# Patient Record
Sex: MALE | Race: WHITE | NOT HISPANIC OR LATINO
[De-identification: names, ages, dates, MRNs, and addresses within clinical notes are randomized per-mention and may not be internally consistent; named-entity substitution may affect disease eponyms.]

---

## 2017-02-02 ENCOUNTER — LABORATORY RESULT (OUTPATIENT)
Age: 47
End: 2017-02-02

## 2017-02-21 ENCOUNTER — TRANSCRIPTION ENCOUNTER (OUTPATIENT)
Age: 47
End: 2017-02-21

## 2017-03-28 ENCOUNTER — APPOINTMENT (OUTPATIENT)
Dept: UROLOGY | Facility: CLINIC | Age: 47
End: 2017-03-28

## 2017-03-29 ENCOUNTER — FORM ENCOUNTER (OUTPATIENT)
Age: 47
End: 2017-03-29

## 2017-03-30 ENCOUNTER — OUTPATIENT (OUTPATIENT)
Dept: OUTPATIENT SERVICES | Facility: HOSPITAL | Age: 47
LOS: 1 days | End: 2017-03-30
Payer: COMMERCIAL

## 2017-03-30 ENCOUNTER — APPOINTMENT (OUTPATIENT)
Dept: ULTRASOUND IMAGING | Facility: CLINIC | Age: 47
End: 2017-03-30

## 2017-03-30 DIAGNOSIS — Z00.8 ENCOUNTER FOR OTHER GENERAL EXAMINATION: ICD-10-CM

## 2017-03-30 DIAGNOSIS — M75.101 UNSPECIFIED ROTATOR CUFF TEAR OR RUPTURE OF RIGHT SHOULDER, NOT SPECIFIED AS TRAUMATIC: Chronic | ICD-10-CM

## 2017-03-30 PROCEDURE — 76775 US EXAM ABDO BACK WALL LIM: CPT

## 2017-04-04 ENCOUNTER — APPOINTMENT (OUTPATIENT)
Dept: UROLOGY | Facility: CLINIC | Age: 47
End: 2017-04-04

## 2017-04-04 VITALS
HEIGHT: 71 IN | HEART RATE: 69 BPM | TEMPERATURE: 98.6 F | WEIGHT: 270 LBS | DIASTOLIC BLOOD PRESSURE: 76 MMHG | SYSTOLIC BLOOD PRESSURE: 120 MMHG | RESPIRATION RATE: 16 BRPM | BODY MASS INDEX: 37.8 KG/M2

## 2017-04-05 LAB — PSA SERPL-MCNC: 0.66 NG/ML

## 2017-04-10 LAB
TESTOST BND SERPL-MCNC: 6.6 PG/ML
TESTOST SERPL-MCNC: 311.6 NG/DL

## 2017-04-12 ENCOUNTER — APPOINTMENT (OUTPATIENT)
Dept: UROLOGY | Facility: CLINIC | Age: 47
End: 2017-04-12

## 2017-04-19 ENCOUNTER — APPOINTMENT (OUTPATIENT)
Dept: UROLOGY | Facility: CLINIC | Age: 47
End: 2017-04-19

## 2017-04-19 RX ORDER — ZOLPIDEM TARTRATE 5 MG/1
TABLET, FILM COATED ORAL
Refills: 0 | Status: ACTIVE | COMMUNITY

## 2017-04-20 LAB
ESTRADIOL SERPL-MCNC: 37 PG/ML
PROLACTIN SERPL-MCNC: 6.8 NG/ML
TSH SERPL-ACNC: 1.67 UIU/ML

## 2017-04-22 LAB — T4BG SERPL-MCNC: 19 UG/ML

## 2017-04-24 LAB
TESTOST BND SERPL-MCNC: 2.8 PG/ML
TESTOST SERPL-MCNC: 239.6 NG/DL

## 2017-04-26 ENCOUNTER — APPOINTMENT (OUTPATIENT)
Dept: UROLOGY | Facility: CLINIC | Age: 47
End: 2017-04-26

## 2017-04-26 VITALS
DIASTOLIC BLOOD PRESSURE: 80 MMHG | HEART RATE: 73 BPM | RESPIRATION RATE: 16 BRPM | SYSTOLIC BLOOD PRESSURE: 119 MMHG | TEMPERATURE: 98.3 F

## 2017-04-27 LAB
FSH SERPL-MCNC: 2.2 IU/L
LH SERPL-ACNC: 3.8 IU/L

## 2017-04-28 ENCOUNTER — MESSAGE (OUTPATIENT)
Age: 47
End: 2017-04-28

## 2017-06-15 ENCOUNTER — APPOINTMENT (OUTPATIENT)
Dept: UROLOGY | Facility: CLINIC | Age: 47
End: 2017-06-15

## 2017-06-15 LAB
ALBUMIN SERPL ELPH-MCNC: 4.3 G/DL
ALP BLD-CCNC: 94 U/L
ALT SERPL-CCNC: 30 U/L
ANION GAP SERPL CALC-SCNC: 18 MMOL/L
AST SERPL-CCNC: 36 U/L
BASOPHILS # BLD AUTO: 0.02 K/UL
BASOPHILS NFR BLD AUTO: 0.3 %
BILIRUB SERPL-MCNC: 0.2 MG/DL
BUN SERPL-MCNC: 18 MG/DL
CALCIUM SERPL-MCNC: 9.5 MG/DL
CHLORIDE SERPL-SCNC: 102 MMOL/L
CO2 SERPL-SCNC: 22 MMOL/L
CREAT SERPL-MCNC: 0.82 MG/DL
EOSINOPHIL # BLD AUTO: 0.09 K/UL
EOSINOPHIL NFR BLD AUTO: 1.4 %
ESTRADIOL SERPL-MCNC: 44 PG/ML
GLUCOSE SERPL-MCNC: 82 MG/DL
HCT VFR BLD CALC: 42.7 %
HGB BLD-MCNC: 14.4 G/DL
IMM GRANULOCYTES NFR BLD AUTO: 0.3 %
LYMPHOCYTES # BLD AUTO: 2.07 K/UL
LYMPHOCYTES NFR BLD AUTO: 32.1 %
MAN DIFF?: NORMAL
MCHC RBC-ENTMCNC: 30.7 PG
MCHC RBC-ENTMCNC: 33.7 GM/DL
MCV RBC AUTO: 91 FL
MONOCYTES # BLD AUTO: 0.43 K/UL
MONOCYTES NFR BLD AUTO: 6.7 %
NEUTROPHILS # BLD AUTO: 3.82 K/UL
NEUTROPHILS NFR BLD AUTO: 59.2 %
PLATELET # BLD AUTO: 273 K/UL
POTASSIUM SERPL-SCNC: 4.2 MMOL/L
PROT SERPL-MCNC: 7.5 G/DL
RBC # BLD: 4.69 M/UL
RBC # FLD: 13.8 %
SODIUM SERPL-SCNC: 142 MMOL/L
TESTOST BND SERPL-MCNC: 9.4 PG/ML
TESTOST SERPL-MCNC: 383.4 NG/DL
WBC # FLD AUTO: 6.45 K/UL

## 2017-06-19 ENCOUNTER — MOBILE ON CALL (OUTPATIENT)
Age: 47
End: 2017-06-19

## 2017-06-23 ENCOUNTER — MEDICATION RENEWAL (OUTPATIENT)
Age: 47
End: 2017-06-23

## 2017-06-23 ENCOUNTER — OTHER (OUTPATIENT)
Age: 47
End: 2017-06-23

## 2017-06-28 ENCOUNTER — MEDICATION RENEWAL (OUTPATIENT)
Age: 47
End: 2017-06-28

## 2017-07-14 ENCOUNTER — OTHER (OUTPATIENT)
Age: 47
End: 2017-07-14

## 2017-07-19 ENCOUNTER — APPOINTMENT (OUTPATIENT)
Dept: UROLOGY | Facility: CLINIC | Age: 47
End: 2017-07-19

## 2017-07-19 VITALS
DIASTOLIC BLOOD PRESSURE: 83 MMHG | OXYGEN SATURATION: 97 % | HEART RATE: 88 BPM | SYSTOLIC BLOOD PRESSURE: 121 MMHG | TEMPERATURE: 98.7 F

## 2017-07-20 LAB
BASOPHILS # BLD AUTO: 0.01 K/UL
BASOPHILS NFR BLD AUTO: 0.1 %
EOSINOPHIL # BLD AUTO: 0.09 K/UL
EOSINOPHIL NFR BLD AUTO: 1.2 %
HCT VFR BLD CALC: 45.2 %
HGB BLD-MCNC: 14.7 G/DL
IMM GRANULOCYTES NFR BLD AUTO: 0.1 %
LYMPHOCYTES # BLD AUTO: 1.99 K/UL
LYMPHOCYTES NFR BLD AUTO: 26.7 %
MAN DIFF?: NORMAL
MCHC RBC-ENTMCNC: 30.6 PG
MCHC RBC-ENTMCNC: 32.5 GM/DL
MCV RBC AUTO: 94 FL
MONOCYTES # BLD AUTO: 0.49 K/UL
MONOCYTES NFR BLD AUTO: 6.6 %
NEUTROPHILS # BLD AUTO: 4.86 K/UL
NEUTROPHILS NFR BLD AUTO: 65.3 %
PLATELET # BLD AUTO: 298 K/UL
RBC # BLD: 4.81 M/UL
RBC # FLD: 14.4 %
TESTOST SERPL-MCNC: 684.2 NG/DL
WBC # FLD AUTO: 7.45 K/UL

## 2017-08-29 ENCOUNTER — MEDICATION RENEWAL (OUTPATIENT)
Age: 47
End: 2017-08-29

## 2017-10-17 ENCOUNTER — APPOINTMENT (OUTPATIENT)
Dept: UROLOGY | Facility: CLINIC | Age: 47
End: 2017-10-17

## 2017-10-19 ENCOUNTER — APPOINTMENT (OUTPATIENT)
Dept: UROLOGY | Facility: CLINIC | Age: 47
End: 2017-10-19
Payer: COMMERCIAL

## 2017-10-19 PROCEDURE — 99213 OFFICE O/P EST LOW 20 MIN: CPT

## 2017-12-13 ENCOUNTER — APPOINTMENT (OUTPATIENT)
Dept: UROLOGY | Facility: CLINIC | Age: 47
End: 2017-12-13
Payer: COMMERCIAL

## 2017-12-13 VITALS
OXYGEN SATURATION: 97 % | TEMPERATURE: 98.5 F | HEART RATE: 81 BPM | SYSTOLIC BLOOD PRESSURE: 119 MMHG | DIASTOLIC BLOOD PRESSURE: 74 MMHG

## 2017-12-13 PROCEDURE — 11980 IMPLANT HORMONE PELLET(S): CPT

## 2017-12-13 PROCEDURE — S0189: CPT

## 2018-03-14 ENCOUNTER — APPOINTMENT (OUTPATIENT)
Dept: UROLOGY | Facility: CLINIC | Age: 48
End: 2018-03-14

## 2018-07-16 ENCOUNTER — APPOINTMENT (OUTPATIENT)
Dept: UROLOGY | Facility: CLINIC | Age: 48
End: 2018-07-16

## 2018-07-25 ENCOUNTER — APPOINTMENT (OUTPATIENT)
Dept: UROLOGY | Facility: CLINIC | Age: 48
End: 2018-07-25
Payer: COMMERCIAL

## 2018-07-25 PROCEDURE — 99213 OFFICE O/P EST LOW 20 MIN: CPT

## 2018-07-25 RX ORDER — TESTOSTERONE 50 MG/5G
50 MG/5GM GEL TRANSDERMAL
Qty: 5 | Refills: 0 | Status: DISCONTINUED | COMMUNITY
Start: 2017-04-26 | End: 2018-07-25

## 2018-07-26 LAB — ESTRADIOL SERPL-MCNC: 45 PG/ML

## 2018-07-27 LAB — TESTOST SERPL-MCNC: 234.6 NG/DL

## 2018-09-13 ENCOUNTER — MEDICATION RENEWAL (OUTPATIENT)
Age: 48
End: 2018-09-13

## 2018-09-26 ENCOUNTER — MEDICATION RENEWAL (OUTPATIENT)
Age: 48
End: 2018-09-26

## 2018-10-03 ENCOUNTER — RX RENEWAL (OUTPATIENT)
Age: 48
End: 2018-10-03

## 2018-10-03 ENCOUNTER — MEDICATION RENEWAL (OUTPATIENT)
Age: 48
End: 2018-10-03

## 2018-10-03 RX ORDER — TESTOSTERONE UNDECANOATE 250 MG/ML
750 INJECTION INTRAMUSCULAR
Qty: 2 | Refills: 0 | Status: COMPLETED | COMMUNITY
Start: 2018-07-25 | End: 2018-10-03

## 2018-10-04 ENCOUNTER — MEDICATION RENEWAL (OUTPATIENT)
Age: 48
End: 2018-10-04

## 2018-11-07 ENCOUNTER — APPOINTMENT (OUTPATIENT)
Dept: UROLOGY | Facility: CLINIC | Age: 48
End: 2018-11-07
Payer: COMMERCIAL

## 2018-11-07 VITALS
OXYGEN SATURATION: 98 % | TEMPERATURE: 98.6 F | HEART RATE: 88 BPM | SYSTOLIC BLOOD PRESSURE: 126 MMHG | DIASTOLIC BLOOD PRESSURE: 83 MMHG

## 2018-11-07 VITALS
OXYGEN SATURATION: 98 % | SYSTOLIC BLOOD PRESSURE: 131 MMHG | DIASTOLIC BLOOD PRESSURE: 84 MMHG | HEART RATE: 87 BPM | TEMPERATURE: 98.1 F

## 2018-11-07 PROCEDURE — 96372 THER/PROPH/DIAG INJ SC/IM: CPT

## 2018-11-07 RX ORDER — TESTOSTERONE UNDECANOATE 250 MG/ML
750 INJECTION INTRAMUSCULAR
Refills: 0 | Status: COMPLETED | OUTPATIENT
Start: 2018-11-07

## 2018-11-07 RX ADMIN — TESTOSTERONE UNDECANOATE 3 MG/3ML: 250 INJECTION INTRAMUSCULAR at 00:00

## 2018-12-05 ENCOUNTER — APPOINTMENT (OUTPATIENT)
Dept: UROLOGY | Facility: CLINIC | Age: 48
End: 2018-12-05
Payer: COMMERCIAL

## 2018-12-05 VITALS — DIASTOLIC BLOOD PRESSURE: 83 MMHG | SYSTOLIC BLOOD PRESSURE: 123 MMHG | HEART RATE: 84 BPM | OXYGEN SATURATION: 98 %

## 2018-12-05 VITALS
DIASTOLIC BLOOD PRESSURE: 77 MMHG | HEART RATE: 83 BPM | TEMPERATURE: 98.6 F | SYSTOLIC BLOOD PRESSURE: 114 MMHG | OXYGEN SATURATION: 97 %

## 2018-12-05 PROCEDURE — 96372 THER/PROPH/DIAG INJ SC/IM: CPT

## 2018-12-05 RX ORDER — TESTOSTERONE UNDECANOATE 250 MG/ML
750 INJECTION INTRAMUSCULAR
Refills: 0 | Status: COMPLETED | OUTPATIENT
Start: 2018-12-05

## 2018-12-05 RX ADMIN — TESTOSTERONE UNDECANOATE 0 MG/3ML: 250 INJECTION INTRAMUSCULAR at 00:00

## 2019-01-16 ENCOUNTER — MEDICATION RENEWAL (OUTPATIENT)
Age: 49
End: 2019-01-16

## 2019-01-18 ENCOUNTER — MESSAGE (OUTPATIENT)
Age: 49
End: 2019-01-18

## 2019-01-23 LAB
ALBUMIN SERPL ELPH-MCNC: 4.3 G/DL
ALP BLD-CCNC: 87 U/L
ALT SERPL-CCNC: 20 U/L
ANION GAP SERPL CALC-SCNC: 11 MMOL/L
AST SERPL-CCNC: 36 U/L
BASOPHILS # BLD AUTO: 0.02 K/UL
BASOPHILS NFR BLD AUTO: 0.3 %
BILIRUB SERPL-MCNC: 0.2 MG/DL
BUN SERPL-MCNC: 14 MG/DL
CALCIUM SERPL-MCNC: 9.9 MG/DL
CHLORIDE SERPL-SCNC: 102 MMOL/L
CO2 SERPL-SCNC: 27 MMOL/L
CREAT SERPL-MCNC: 0.89 MG/DL
EOSINOPHIL # BLD AUTO: 0.09 K/UL
EOSINOPHIL NFR BLD AUTO: 1.2 %
ESTRADIOL SERPL-MCNC: 42 PG/ML
GLUCOSE SERPL-MCNC: 125 MG/DL
HCT VFR BLD CALC: 45.5 %
HGB BLD-MCNC: 15.1 G/DL
IMM GRANULOCYTES NFR BLD AUTO: 0.3 %
LYMPHOCYTES # BLD AUTO: 2.2 K/UL
LYMPHOCYTES NFR BLD AUTO: 28.4 %
MAN DIFF?: NORMAL
MCHC RBC-ENTMCNC: 30 PG
MCHC RBC-ENTMCNC: 33.2 GM/DL
MCV RBC AUTO: 90.3 FL
MONOCYTES # BLD AUTO: 0.54 K/UL
MONOCYTES NFR BLD AUTO: 7 %
NEUTROPHILS # BLD AUTO: 4.89 K/UL
NEUTROPHILS NFR BLD AUTO: 62.8 %
PLATELET # BLD AUTO: 322 K/UL
POTASSIUM SERPL-SCNC: 4.5 MMOL/L
PROT SERPL-MCNC: 7.8 G/DL
RBC # BLD: 5.04 M/UL
RBC # FLD: 13.5 %
SODIUM SERPL-SCNC: 140 MMOL/L
TESTOST SERPL-MCNC: 263.8 NG/DL
WBC # FLD AUTO: 7.76 K/UL

## 2019-02-13 ENCOUNTER — APPOINTMENT (OUTPATIENT)
Dept: UROLOGY | Facility: CLINIC | Age: 49
End: 2019-02-13
Payer: COMMERCIAL

## 2019-02-13 VITALS
SYSTOLIC BLOOD PRESSURE: 123 MMHG | TEMPERATURE: 98.4 F | DIASTOLIC BLOOD PRESSURE: 81 MMHG | HEART RATE: 84 BPM | OXYGEN SATURATION: 95 %

## 2019-02-13 VITALS
HEART RATE: 77 BPM | TEMPERATURE: 98.6 F | SYSTOLIC BLOOD PRESSURE: 125 MMHG | DIASTOLIC BLOOD PRESSURE: 87 MMHG | OXYGEN SATURATION: 94 %

## 2019-02-13 PROCEDURE — 96372 THER/PROPH/DIAG INJ SC/IM: CPT

## 2019-02-13 PROCEDURE — 99213 OFFICE O/P EST LOW 20 MIN: CPT | Mod: 25

## 2019-02-13 RX ORDER — TESTOSTERONE UNDECANOATE 250 MG/ML
750 INJECTION INTRAMUSCULAR
Refills: 0 | Status: COMPLETED | OUTPATIENT
Start: 2019-02-13

## 2019-02-13 RX ORDER — TESTOSTERONE UNDECANOATE 250 MG/ML
750 INJECTION INTRAMUSCULAR
Qty: 1 | Refills: 0 | Status: COMPLETED | COMMUNITY
Start: 2018-07-25 | End: 2019-02-13

## 2019-02-13 RX ADMIN — TESTOSTERONE UNDECANOATE 0 MG/3ML: 250 INJECTION INTRAMUSCULAR at 00:00

## 2019-02-13 NOTE — PHYSICAL EXAM
[General Appearance - Well Developed] : well developed [Bowel Sounds] : normal bowel sounds [Abdomen Soft] : soft [Abdomen Tenderness] : non-tender [] : no hepato-splenomegaly [Urethral Meatus] : meatus normal [Penis Abnormality] : normal circumcised penis [Epididymis] : the epididymides were normal [Testes Tenderness] : no tenderness of the testes [Skin Color & Pigmentation] : normal skin color and pigmentation

## 2019-02-13 NOTE — ASSESSMENT
[FreeTextEntry1] : Patient is doing well with AVEED\par "much better than other "\par good sexual function with sildenafil (60-80mg)\par libido maintained on AVEED\par Feels back to NORMAL\par pleased

## 2019-02-13 NOTE — HISTORY OF PRESENT ILLNESS
[None] : no symptoms [FreeTextEntry1] : Patient referred by Dr Ankur Abel for sexual dysfunction.\par Patient complaining of loss of libido x 3 months.\par No evaluation.\par Erections generally good.\par One one occasion lost erection during SI.\par  2.5 (second marriage) years\par 2 step children; and one 1.5 year old son.\par Son with neutropenia (viral)\par Couple very anxious\par \par 4.26.2017\par patient took only 40 mg of sildenafil ; 4/10 rigidity\par One hour later had partial tumescence\par Sufficient for SI; experienced premature ejaculation; had SI\par mild nasal congestion otherwise no side effects\par \par 5. 2017 started on testim; no side effects; "felt amazing" for the first several days and then regressed towards pretreatment. Symptoms better than prior to treatment.\par \par 7.19.2017\par Continues on testim one tube daily; had difficulty getting 2 tubes daily; insurance covered brand name but not generic. Energy improved, sexual function with Sildenafil 100 mg; erections good, no prolonged erection. Pleased\par \par 10.19.2017\par again stopped T replacement\par insurance would pay for brand but not generic.\par has been off for several days\par sexual function ok on sildenafil \par \par 7.25.2018\par returns with diminished libido\par seen by internist and repeated T\par T 183\par free T 39.1 \par \par \par 2.13.2019 \par patient is now maintained on AVEED (3rd injection today)

## 2019-03-19 ENCOUNTER — MESSAGE (OUTPATIENT)
Age: 49
End: 2019-03-19

## 2019-03-26 ENCOUNTER — MEDICATION RENEWAL (OUTPATIENT)
Age: 49
End: 2019-03-26

## 2019-04-17 ENCOUNTER — APPOINTMENT (OUTPATIENT)
Dept: UROLOGY | Facility: CLINIC | Age: 49
End: 2019-04-17

## 2019-04-24 ENCOUNTER — APPOINTMENT (OUTPATIENT)
Dept: UROLOGY | Facility: CLINIC | Age: 49
End: 2019-04-24
Payer: COMMERCIAL

## 2019-04-24 VITALS
HEART RATE: 97 BPM | OXYGEN SATURATION: 94 % | SYSTOLIC BLOOD PRESSURE: 132 MMHG | TEMPERATURE: 98.2 F | DIASTOLIC BLOOD PRESSURE: 84 MMHG

## 2019-04-24 VITALS — SYSTOLIC BLOOD PRESSURE: 118 MMHG | DIASTOLIC BLOOD PRESSURE: 81 MMHG

## 2019-04-24 PROCEDURE — 96372 THER/PROPH/DIAG INJ SC/IM: CPT

## 2019-04-25 RX ORDER — TESTOSTERONE UNDECANOATE 250 MG/ML
750 INJECTION INTRAMUSCULAR
Refills: 0 | Status: COMPLETED | OUTPATIENT
Start: 2019-04-25

## 2019-04-25 RX ADMIN — TESTOSTERONE UNDECANOATE 0 MG/3ML: 250 INJECTION INTRAMUSCULAR at 00:00

## 2019-06-17 ENCOUNTER — OTHER (OUTPATIENT)
Age: 49
End: 2019-06-17

## 2019-06-19 ENCOUNTER — MEDICATION RENEWAL (OUTPATIENT)
Age: 49
End: 2019-06-19

## 2019-06-24 LAB
ALBUMIN SERPL ELPH-MCNC: 4.2 G/DL
ALP BLD-CCNC: 91 U/L
ALT SERPL-CCNC: 20 U/L
ANION GAP SERPL CALC-SCNC: 13 MMOL/L
AST SERPL-CCNC: 26 U/L
BASOPHILS # BLD AUTO: 0.03 K/UL
BASOPHILS NFR BLD AUTO: 0.4 %
BILIRUB SERPL-MCNC: 0.3 MG/DL
BUN SERPL-MCNC: 13 MG/DL
CALCIUM SERPL-MCNC: 9.1 MG/DL
CHLORIDE SERPL-SCNC: 101 MMOL/L
CO2 SERPL-SCNC: 24 MMOL/L
CREAT SERPL-MCNC: 0.83 MG/DL
EOSINOPHIL # BLD AUTO: 0.06 K/UL
EOSINOPHIL NFR BLD AUTO: 0.8 %
ESTRADIOL SERPL-MCNC: 73 PG/ML
GLUCOSE SERPL-MCNC: 124 MG/DL
HCT VFR BLD CALC: 45 %
HGB BLD-MCNC: 14.6 G/DL
IMM GRANULOCYTES NFR BLD AUTO: 0.4 %
LYMPHOCYTES # BLD AUTO: 1.95 K/UL
LYMPHOCYTES NFR BLD AUTO: 25.5 %
MAN DIFF?: NORMAL
MCHC RBC-ENTMCNC: 29 PG
MCHC RBC-ENTMCNC: 32.4 GM/DL
MCV RBC AUTO: 89.3 FL
MONOCYTES # BLD AUTO: 0.54 K/UL
MONOCYTES NFR BLD AUTO: 7.1 %
NEUTROPHILS # BLD AUTO: 5.04 K/UL
NEUTROPHILS NFR BLD AUTO: 65.8 %
PLATELET # BLD AUTO: 262 K/UL
POTASSIUM SERPL-SCNC: 4.5 MMOL/L
PROT SERPL-MCNC: 6.8 G/DL
RBC # BLD: 5.04 M/UL
RBC # FLD: 13.7 %
SODIUM SERPL-SCNC: 138 MMOL/L
TESTOST SERPL-MCNC: 463 NG/DL
TSH SERPL-ACNC: 2.86 UIU/ML
WBC # FLD AUTO: 7.65 K/UL

## 2019-06-26 ENCOUNTER — APPOINTMENT (OUTPATIENT)
Dept: UROLOGY | Facility: CLINIC | Age: 49
End: 2019-06-26
Payer: COMMERCIAL

## 2019-06-26 VITALS
SYSTOLIC BLOOD PRESSURE: 127 MMHG | TEMPERATURE: 98.2 F | OXYGEN SATURATION: 96 % | DIASTOLIC BLOOD PRESSURE: 84 MMHG | HEART RATE: 88 BPM

## 2019-06-26 VITALS — SYSTOLIC BLOOD PRESSURE: 135 MMHG | DIASTOLIC BLOOD PRESSURE: 85 MMHG | OXYGEN SATURATION: 96 % | HEART RATE: 82 BPM

## 2019-06-26 PROCEDURE — 99213 OFFICE O/P EST LOW 20 MIN: CPT | Mod: 25

## 2019-06-26 PROCEDURE — 96372 THER/PROPH/DIAG INJ SC/IM: CPT

## 2019-06-26 RX ORDER — TESTOSTERONE 50 MG/5G
50 MG/5GM GEL TRANSDERMAL
Qty: 5 | Refills: 0 | Status: DISCONTINUED | COMMUNITY
Start: 2017-06-15 | End: 2019-06-26

## 2019-06-26 RX ORDER — TESTOSTERONE 50 MG/5G
50 MG/5GM GEL TRANSDERMAL
Qty: 30 | Refills: 0 | Status: DISCONTINUED | COMMUNITY
Start: 2017-06-28 | End: 2019-06-26

## 2019-06-26 RX ORDER — TESTOSTERONE 50 MG/5G
50 MG/5GM GEL TRANSDERMAL
Qty: 30 | Refills: 0 | Status: DISCONTINUED | COMMUNITY
Start: 2017-07-20 | End: 2019-06-26

## 2019-06-26 RX ORDER — TESTOSTERONE UNDECANOATE 250 MG/ML
750 INJECTION INTRAMUSCULAR
Refills: 0 | Status: COMPLETED | OUTPATIENT
Start: 2019-06-26

## 2019-06-26 RX ORDER — TESTOSTERONE 50 MG/5G
50 MG/5GM GEL TRANSDERMAL
Qty: 30 | Refills: 0 | Status: DISCONTINUED | COMMUNITY
Start: 2017-05-08 | End: 2019-06-26

## 2019-06-26 RX ORDER — TESTOSTERONE 50 MG/5G
50 MG/5GM GEL TRANSDERMAL
Qty: 30 | Refills: 0 | Status: DISCONTINUED | COMMUNITY
Start: 2017-04-27 | End: 2019-06-26

## 2019-06-26 RX ORDER — TESTOSTERONE 50 MG/5G
50 MG/5GM GEL TRANSDERMAL
Qty: 1 | Refills: 0 | Status: DISCONTINUED | COMMUNITY
Start: 2017-10-19 | End: 2019-06-26

## 2019-06-26 RX ADMIN — TESTOSTERONE UNDECANOATE 0 MG/3ML: 250 INJECTION INTRAMUSCULAR at 00:00

## 2019-06-26 NOTE — ASSESSMENT
[FreeTextEntry1] : Patient on maintenance aveed\par pleased in general although feels that not doing as well at end of interval\par emphasized importance of staying on schedule\par discussed increased E2 and potential utilization of arimidex\par will check levels midcycle to assess T levels and determine

## 2019-06-26 NOTE — HISTORY OF PRESENT ILLNESS
[FreeTextEntry1] : Patient referred by Dr Ankur Abel for sexual dysfunction.\par Patient complaining of loss of libido x 3 months.\par No evaluation.\par Erections generally good.\par One one occasion lost erection during SI.\par  2.5 (second marriage) years\par 2 step children; and one 1.5 year old son.\par Son with neutropenia (viral)\par Couple very anxious\par \par 4.26.2017\par patient took only 40 mg of sildenafil ; 4/10 rigidity\par One hour later had partial tumescence\par Sufficient for SI; experienced premature ejaculation; had SI\par mild nasal congestion otherwise no side effects\par \par 5. 2017 started on testim; no side effects; "felt amazing" for the first several days and then regressed towards pretreatment. Symptoms better than prior to treatment.\par \par 7.19.2017\par Continues on testim one tube daily; had difficulty getting 2 tubes daily; insurance covered brand name but not generic. Energy improved, sexual function with Sildenafil 100 mg; erections good, no prolonged erection. Pleased\par \par 10.19.2017\par again stopped T replacement\par insurance would pay for brand but not generic.\par has been off for several days\par sexual function ok on sildenafil \par \par 7.25.2018\par returns with diminished libido\par seen by internist and repeated T\par T 183\par free T 39.1 \par \par \par 2.13.2019 \par patient is now maintained on AVEED (3rd injection today)\par \par 6.26.2019\par patient maintained on AVEED\par missed injection cycle\par now complaining of loss of libido and energy\par had surgery for deviated septum

## 2019-07-18 ENCOUNTER — LABORATORY RESULT (OUTPATIENT)
Age: 49
End: 2019-07-18

## 2019-08-07 ENCOUNTER — APPOINTMENT (OUTPATIENT)
Dept: UROLOGY | Facility: CLINIC | Age: 49
End: 2019-08-07

## 2019-08-14 ENCOUNTER — APPOINTMENT (OUTPATIENT)
Dept: UROLOGY | Facility: CLINIC | Age: 49
End: 2019-08-14
Payer: COMMERCIAL

## 2019-08-14 LAB
ESTRADIOL SERPL-MCNC: 7 PG/ML
HCT VFR BLD CALC: 50.2 %
HGB BLD-MCNC: 15.8 G/DL
TESTOST SERPL-MCNC: 376 NG/DL

## 2019-08-14 PROCEDURE — 99213 OFFICE O/P EST LOW 20 MIN: CPT

## 2019-08-14 NOTE — PHYSICAL EXAM
[General Appearance - Well Developed] : well developed [General Appearance - Well Nourished] : well nourished [Bowel Sounds] : normal bowel sounds [Urethral Meatus] : meatus normal [Testes Tenderness] : no tenderness of the testes [FreeTextEntry1] : no acne

## 2019-08-14 NOTE — HISTORY OF PRESENT ILLNESS
[FreeTextEntry1] : Patient referred by Dr Ankur Abel for sexual dysfunction.\par Patient complaining of loss of libido x 3 months.\par No evaluation.\par Erections generally good.\par One one occasion lost erection during SI.\par  2.5 (second marriage) years\par 2 step children; and one 1.5 year old son.\par Son with neutropenia (viral)\par Couple very anxious\par \par 4.26.2017\par patient took only 40 mg of sildenafil ; 4/10 rigidity\par One hour later had partial tumescence\par Sufficient for SI; experienced premature ejaculation; had SI\par mild nasal congestion otherwise no side effects\par \par 5. 2017 started on testim; no side effects; "felt amazing" for the first several days and then regressed towards pretreatment. Symptoms better than prior to treatment.\par \par 7.19.2017\par Continues on testim one tube daily; had difficulty getting 2 tubes daily; insurance covered brand name but not generic. Energy improved, sexual function with Sildenafil 100 mg; erections good, no prolonged erection. Pleased\par \par 10.19.2017\par again stopped T replacement\par insurance would pay for brand but not generic.\par has been off for several days\par sexual function ok on sildenafil \par \par 7.25.2018\par returns with diminished libido\par seen by internist and repeated T\par T 183\par free T 39.1 \par \par \par 2.13.2019 \par patient is now maintained on AVEED (3rd injection today)\par \par 6.26.2019\par patient maintained on AVEED\par missed injection cycle\par now complaining of loss of libido and energy\par had surgery for deviated septum \par \par 8.14.2019\par patient on AVEED and Arimidex\par Improved energy and sexual functon

## 2019-08-14 NOTE — ASSESSMENT
[FreeTextEntry1] : Labs reviewed\par Estradiol now normal\par T and Hbg/Hct stable\par Continue on current regimen\par Aveed q 10 weeks\par \par discussed weight control\par discussed referral to weight control/obesity

## 2019-08-26 ENCOUNTER — MEDICATION RENEWAL (OUTPATIENT)
Age: 49
End: 2019-08-26

## 2019-08-28 ENCOUNTER — APPOINTMENT (OUTPATIENT)
Dept: UROLOGY | Facility: CLINIC | Age: 49
End: 2019-08-28
Payer: COMMERCIAL

## 2019-08-28 VITALS
SYSTOLIC BLOOD PRESSURE: 132 MMHG | DIASTOLIC BLOOD PRESSURE: 84 MMHG | OXYGEN SATURATION: 95 % | BODY MASS INDEX: 38.08 KG/M2 | HEIGHT: 71 IN | HEART RATE: 78 BPM | WEIGHT: 272 LBS

## 2019-08-28 VITALS
DIASTOLIC BLOOD PRESSURE: 87 MMHG | TEMPERATURE: 98 F | SYSTOLIC BLOOD PRESSURE: 128 MMHG | HEART RATE: 67 BPM | OXYGEN SATURATION: 96 %

## 2019-08-28 PROCEDURE — 96372 THER/PROPH/DIAG INJ SC/IM: CPT

## 2019-08-28 PROCEDURE — 99213 OFFICE O/P EST LOW 20 MIN: CPT | Mod: 25

## 2019-08-28 RX ORDER — TESTOSTERONE 50 MG/5G
50 MG/5GM GEL TRANSDERMAL
Qty: 30 | Refills: 0 | Status: DISCONTINUED | COMMUNITY
Start: 2017-06-28 | End: 2019-08-28

## 2019-08-29 RX ORDER — TESTOSTERONE UNDECANOATE 250 MG/ML
750 INJECTION INTRAMUSCULAR
Refills: 0 | Status: COMPLETED | OUTPATIENT
Start: 2019-08-29

## 2019-08-29 RX ADMIN — TESTOSTERONE UNDECANOATE 0 MG/3ML: 250 INJECTION INTRAMUSCULAR at 00:00

## 2019-10-16 ENCOUNTER — FORM ENCOUNTER (OUTPATIENT)
Age: 49
End: 2019-10-16

## 2019-10-17 ENCOUNTER — APPOINTMENT (OUTPATIENT)
Dept: ULTRASOUND IMAGING | Facility: IMAGING CENTER | Age: 49
End: 2019-10-17
Payer: COMMERCIAL

## 2019-10-17 ENCOUNTER — APPOINTMENT (OUTPATIENT)
Dept: UROLOGY | Facility: CLINIC | Age: 49
End: 2019-10-17
Payer: COMMERCIAL

## 2019-10-17 ENCOUNTER — OUTPATIENT (OUTPATIENT)
Dept: OUTPATIENT SERVICES | Facility: HOSPITAL | Age: 49
LOS: 1 days | End: 2019-10-17
Payer: COMMERCIAL

## 2019-10-17 DIAGNOSIS — R10.9 UNSPECIFIED ABDOMINAL PAIN: ICD-10-CM

## 2019-10-17 DIAGNOSIS — M75.101 UNSPECIFIED ROTATOR CUFF TEAR OR RUPTURE OF RIGHT SHOULDER, NOT SPECIFIED AS TRAUMATIC: Chronic | ICD-10-CM

## 2019-10-17 PROCEDURE — 99213 OFFICE O/P EST LOW 20 MIN: CPT

## 2019-10-17 PROCEDURE — 76770 US EXAM ABDO BACK WALL COMP: CPT | Mod: 26

## 2019-10-17 PROCEDURE — 76770 US EXAM ABDO BACK WALL COMP: CPT

## 2019-10-20 NOTE — HISTORY OF PRESENT ILLNESS
[FreeTextEntry1] : Silverio Lange returns to the office today. He is a 49-year-old male with history of kidney stones. He is back today in the office because he recently was experiencing some right-sided lower back pain and flank pain this started about 2 weeks ago. The patient states that this has not been nearly as severe as when he had known kidney stones in the past but he wanted to be sure that he was not experiencing any recurrence of kidney stones. The pain started 2 weeks ago but he does feel as though it is improving. There is no associated hematuria or dysuria. He also denies any fevers or chills and has had no nausea or vomiting. His appetite is stable. He denies any unintentional weight loss. There is no constipation.

## 2019-10-20 NOTE — ASSESSMENT
[FreeTextEntry1] : I sent the patient for renal sonogram to assess for any evidence of stone either by direct visualization or indirect evidence by hydronephrosis. Both kidneys showed no evidence of hydronephrosis or stone on either side. He has a lower pole cyst of the left kidney measuring 1.6 cm in largest dimension which is decreased in size relative to prior evaluation. At this time, the appears to be no evidence of stone.\par \par I reassured the patient that there is no evidence of stone disease at this time. I suspect his flank pain with likely musculoskeletal and because of his are continuing to improve, I don't think he needs to take any additional intervention at this time.

## 2019-11-07 ENCOUNTER — MEDICATION RENEWAL (OUTPATIENT)
Age: 49
End: 2019-11-07

## 2019-11-13 ENCOUNTER — APPOINTMENT (OUTPATIENT)
Dept: UROLOGY | Facility: CLINIC | Age: 49
End: 2019-11-13
Payer: COMMERCIAL

## 2019-11-13 VITALS
OXYGEN SATURATION: 100 % | HEART RATE: 88 BPM | TEMPERATURE: 98.4 F | SYSTOLIC BLOOD PRESSURE: 134 MMHG | DIASTOLIC BLOOD PRESSURE: 91 MMHG

## 2019-11-13 PROCEDURE — 96372 THER/PROPH/DIAG INJ SC/IM: CPT

## 2019-11-13 PROCEDURE — 99212 OFFICE O/P EST SF 10 MIN: CPT | Mod: 25

## 2019-11-14 RX ORDER — TESTOSTERONE UNDECANOATE 250 MG/ML
750 INJECTION INTRAMUSCULAR
Refills: 0 | Status: COMPLETED | OUTPATIENT
Start: 2019-11-14

## 2019-11-14 RX ADMIN — TESTOSTERONE UNDECANOATE 0 MG/3ML: 250 INJECTION INTRAMUSCULAR at 00:00

## 2019-11-14 NOTE — HISTORY OF PRESENT ILLNESS
[FreeTextEntry1] : Patient referred by Dr Ankur Abel for sexual dysfunction.\par Patient complaining of loss of libido x 3 months.\par No evaluation.\par Erections generally good.\par One one occasion lost erection during SI.\par  2.5 (second marriage) years\par 2 step children; and one 1.5 year old son.\par Son with neutropenia (viral)\par Couple very anxious\par \par 4.26.2017\par patient took only 40 mg of sildenafil ; 4/10 rigidity\par One hour later had partial tumescence\par Sufficient for SI; experienced premature ejaculation; had SI\par mild nasal congestion otherwise no side effects\par \par 5. 2017 started on testim; no side effects; "felt amazing" for the first several days and then regressed towards pretreatment. Symptoms better than prior to treatment.\par \par 7.19.2017\par Continues on testim one tube daily; had difficulty getting 2 tubes daily; insurance covered brand name but not generic. Energy improved, sexual function with Sildenafil 100 mg; erections good, no prolonged erection. Pleased\par \par 10.19.2017\par again stopped T replacement\par insurance would pay for brand but not generic.\par has been off for several days\par sexual function ok on sildenafil \par \par 7.25.2018\par returns with diminished libido\par seen by internist and repeated T\par T 183\par free T 39.1 \par \par \par 2.13.2019 \par patient is now maintained on AVEED (3rd injection today)\par \par 6.26.2019\par patient maintained on AVEED\par missed injection cycle\par now complaining of loss of libido and energy\par had surgery for deviated septum \par \par 8.14.2019\par patient on AVEED and Arimidex\par Improved energy and sexual functon\par \par 11.13.2019\par patient returns for AVEED and management\par he has stopped  ARIMIDEX (ran out of)\par feeling less energetic during this cycle

## 2019-11-14 NOTE — ASSESSMENT
[FreeTextEntry1] : Patient on maintenance aveed\par pleased in general although feels that not doing as well at end of interval\par DIscussed and emphasized importance of staying on schedule\par discussed increased E2 and  arimidex\par \par will restart Arimidex and assess labs in 2 week since patient feels not as good as previously

## 2019-11-20 ENCOUNTER — APPOINTMENT (OUTPATIENT)
Dept: UROLOGY | Facility: CLINIC | Age: 49
End: 2019-11-20

## 2019-11-25 ENCOUNTER — TRANSCRIPTION ENCOUNTER (OUTPATIENT)
Age: 49
End: 2019-11-25

## 2020-01-02 ENCOUNTER — MEDICATION RENEWAL (OUTPATIENT)
Age: 50
End: 2020-01-02

## 2020-01-30 ENCOUNTER — APPOINTMENT (OUTPATIENT)
Dept: UROLOGY | Facility: CLINIC | Age: 50
End: 2020-01-30
Payer: COMMERCIAL

## 2020-01-30 VITALS
TEMPERATURE: 98.3 F | OXYGEN SATURATION: 97 % | HEART RATE: 100 BPM | DIASTOLIC BLOOD PRESSURE: 91 MMHG | SYSTOLIC BLOOD PRESSURE: 136 MMHG

## 2020-01-30 VITALS — HEART RATE: 79 BPM | SYSTOLIC BLOOD PRESSURE: 132 MMHG | OXYGEN SATURATION: 96 % | DIASTOLIC BLOOD PRESSURE: 86 MMHG

## 2020-01-30 PROCEDURE — 96372 THER/PROPH/DIAG INJ SC/IM: CPT

## 2020-01-30 PROCEDURE — 99212 OFFICE O/P EST SF 10 MIN: CPT | Mod: 25

## 2020-01-30 RX ORDER — TESTOSTERONE UNDECANOATE 250 MG/ML
750 INJECTION INTRAMUSCULAR
Refills: 0 | Status: COMPLETED | OUTPATIENT
Start: 2020-01-30

## 2020-01-30 RX ADMIN — TESTOSTERONE UNDECANOATE 0 MG/3ML: 250 INJECTION INTRAMUSCULAR at 00:00

## 2020-01-30 NOTE — HISTORY OF PRESENT ILLNESS
[FreeTextEntry1] : Patient referred by Dr Ankur Abel for sexual dysfunction.\par Patient complaining of loss of libido x 3 months.\par No evaluation.\par Erections generally good.\par One one occasion lost erection during SI.\par  2.5 (second marriage) years\par 2 step children; and one 1.5 year old son.\par Son with neutropenia (viral)\par Couple very anxious\par \par 4.26.2017\par patient took only 40 mg of sildenafil ; 4/10 rigidity\par One hour later had partial tumescence\par Sufficient for SI; experienced premature ejaculation; had SI\par mild nasal congestion otherwise no side effects\par \par 5. 2017 started on testim; no side effects; "felt amazing" for the first several days and then regressed towards pretreatment. Symptoms better than prior to treatment.\par \par 7.19.2017\par Continues on testim one tube daily; had difficulty getting 2 tubes daily; insurance covered brand name but not generic. Energy improved, sexual function with Sildenafil 100 mg; erections good, no prolonged erection. Pleased\par \par 10.19.2017\par again stopped T replacement\par insurance would pay for brand but not generic.\par has been off for several days\par sexual function ok on sildenafil \par \par 7.25.2018\par returns with diminished libido\par seen by internist and repeated T\par T 183\par free T 39.1 \par \par \par 2.13.2019 \par patient is now maintained on AVEED (3rd injection today)\par \par 6.26.2019\par patient maintained on AVEED\par missed injection cycle\par now complaining of loss of libido and energy\par had surgery for deviated septum \par \par 8.14.2019\par patient on AVEED and Arimidex\par Improved energy and sexual functon\par \par 11.13.2019\par patient returns for AVEED and management\par he has stopped  ARIMIDEX (ran out of)\par feeling less energetic during this cycle\par \par 1.30.2020\par patient returns for AVEED\par again is off cycle\par patient embarked on weigh reduction and has last 15 lbs

## 2020-01-30 NOTE — ASSESSMENT
[FreeTextEntry1] : Patient on maintenance aveed\par pleased in general although feels that not doing as well at end of interval\par again off schedule\par \par DIscussed and emphasized importance of staying on schedule\par discussed increased E2 and  arimidex\par He has also ran out of arimidex\par \par labs drawn today prior to injection to assess trough value\par \par fu in 10 weeks

## 2020-01-31 LAB
HCT VFR BLD CALC: 49.9 %
HGB BLD-MCNC: 16.3 G/DL
TESTOST SERPL-MCNC: 339 NG/DL

## 2020-04-09 ENCOUNTER — APPOINTMENT (OUTPATIENT)
Dept: UROLOGY | Facility: CLINIC | Age: 50
End: 2020-04-09

## 2020-04-15 ENCOUNTER — APPOINTMENT (OUTPATIENT)
Dept: UROLOGY | Facility: CLINIC | Age: 50
End: 2020-04-15
Payer: COMMERCIAL

## 2020-04-15 DIAGNOSIS — E66.9 OBESITY, UNSPECIFIED: ICD-10-CM

## 2020-04-15 PROCEDURE — 99213 OFFICE O/P EST LOW 20 MIN: CPT | Mod: 95

## 2020-04-15 NOTE — PHYSICAL EXAM
[Urethral Meatus] : meatus normal [Testes Tenderness] : no tenderness of the testes [FreeTextEntry1] : self exam video directed

## 2020-04-15 NOTE — REVIEW OF SYSTEMS
[Feeling Tired] : feeling tired [Recent Weight Loss (___ Lbs)] : recent [unfilled] ~Ulb weight loss [Fever] : no fever [Chills] : no chills

## 2020-04-15 NOTE — HISTORY OF PRESENT ILLNESS
[Home] : at home, [unfilled] , at the time of the visit. [Other Location: e.g. Home (Enter Location, City,State)___] : at [unfilled] [Patient] : the patient [Self] : self [None] : no symptoms [FreeTextEntry4] : NA [FreeTextEntry1] : Patient referred by Dr Ankur Abel for sexual dysfunction.\par Patient complaining of loss of libido x 3 months.\par No evaluation.\par Erections generally good.\par One one occasion lost erection during SI.\par  2.5 (second marriage) years\par 2 step children; and one 1.5 year old son.\par Son with neutropenia (viral)\par Couple very anxious\par \par 4.26.2017\par patient took only 40 mg of sildenafil ; 4/10 rigidity\par One hour later had partial tumescence\par Sufficient for SI; experienced premature ejaculation; had SI\par mild nasal congestion otherwise no side effects\par \par 5. 2017 started on testim; no side effects; "felt amazing" for the first several days and then regressed towards pretreatment. Symptoms better than prior to treatment.\par \par 7.19.2017\par Continues on testim one tube daily; had difficulty getting 2 tubes daily; insurance covered brand name but not generic. Energy improved, sexual function with Sildenafil 100 mg; erections good, no prolonged erection. Pleased\par \par 10.19.2017\par again stopped T replacement\par insurance would pay for brand but not generic.\par has been off for several days\par sexual function ok on sildenafil \par \par 7.25.2018\par returns with diminished libido\par seen by internist and repeated T\par T 183\par free T 39.1 \par \par \par 2.13.2019 \par patient is now maintained on AVEED (3rd injection today)\par \par 6.26.2019\par patient maintained on AVEED\par missed injection cycle\par now complaining of loss of libido and energy\par had surgery for deviated septum \par \par 8.14.2019\par patient on AVEED and Arimidex\par Improved energy and sexual functon\par \par 11.13.2019\par patient returns for AVEED and management\par he has stopped  ARIMIDEX (ran out of)\par feeling less energetic during this cycle\par \par 1.30.2020\par patient returns for AVEED\par again is off cycle\par patient embarked on weigh reduction and has last 15 lbs\par \par 4.15.2020 \par The patient-doctor relationship has been established in a face to face fashion via real time video/audio HIPAA compliant communication using telemedicine software.  The patient's identity has been confirmed.  The patient was previously emailed a copy of the telemedicine consent.  They have had a chance to review and has now given verbal consent and has requested care to be assessed and treated through telemedicine and understands there maybe limitations in this process and they may need further follow up care in the office and or hospital settings.   \par THE FOLLOWING WAS READ TO AND CONSENTED TO BY PATIENT\par By virtue of my participation in this telehealth visit, I am consenting to receive care through telehealth. Telehealth is the use of electronic information and communication technologies by providers to deliver health care to patients at a distance. \par I understand that any care provided to me through [employer]s telehealth application (“the [employer] Real[employer]Time kesha”) will incorporate security protocols to protect the privacy and security of my health information. If any other application is used to provide care to me, I understand that the technology may not contain appropriate security protocols to protect the privacy and security of my health information. My provider has explained to me the risks associated with the technology platforms that he or she is using to provide care to me. I acknowledge that there are potential risks associated with any technology used while obtaining care through telehealth, including, but not limited to, connectively interruptions, other technical difficulties, and unauthorized access by a third party to one’s health information. Despite these risks, I agree to participate in the telehealth encounter. \par I understand and agree that I or my healthcare provider may terminate a telehealth encounter at any time in the event of a technical malfunction. \par I also understand that my location determines where medicine is being practiced. As a result, I will inform my provider where I am located at the time of my telehealth visit. \par I understand that there may be costs associated with a telehealth visit. I agree that I am responsible for any fees associated with the telehealth services that I receive. \par This Informed Consent for Telehealth Services During a Public Health Emergency will remain in effect solely during the term of the public health emergency.\par \par Verbal consent given on Apr 15, 2020  10:30AM by Bran Mar from Mr. KRYSTIAN SANCHES \par  \par \par Last aveed January 30th\par patient is behind schedule\par anxiety related to COVID19\par sexual activity maintained [Erectile Dysfunction] : no Erectile Dysfunction

## 2020-04-15 NOTE — ASSESSMENT
[FreeTextEntry1] : Patient had been on maintenance aveed\par Unable to continue because of pandemic\par Will restart on Testim\par \par We had an extensive discussion re Risks of T replacement\par We discussed risk to T exposure to partners and children from transdermal appoaches.\par Will restart \par Labs in 3 weeks if able to go to lab in light of pandemic (patient does not want to go out)\par \par \par fu in 10 weeks

## 2020-07-21 ENCOUNTER — APPOINTMENT (OUTPATIENT)
Dept: UROLOGY | Facility: CLINIC | Age: 50
End: 2020-07-21
Payer: COMMERCIAL

## 2020-07-21 PROCEDURE — 99213 OFFICE O/P EST LOW 20 MIN: CPT | Mod: 95

## 2020-07-21 RX ORDER — TESTOSTERONE UNDECANOATE 250 MG/ML
750 INJECTION INTRAMUSCULAR
Qty: 1 | Refills: 0 | Status: DISCONTINUED | COMMUNITY
Start: 2019-02-13 | End: 2020-07-21

## 2020-07-21 RX ORDER — ANASTROZOLE TABLETS 1 MG/1
1 TABLET ORAL
Qty: 30 | Refills: 1 | Status: DISCONTINUED | COMMUNITY
Start: 2019-07-18 | End: 2020-07-21

## 2020-07-21 NOTE — REVIEW OF SYSTEMS
[see HPI] : see HPI [Recent Weight Loss (___ Lbs)] : recent [unfilled] ~Ulb weight loss [Fever] : no fever [Chills] : no chills [Feeling Tired] : not feeling tired

## 2020-07-21 NOTE — ASSESSMENT
[FreeTextEntry1] : Patient had been on maintenance aveed\par Unable to continue because of pandemic\par Will restart on Testim\par \par We had an extensive discussion re Risks of T replacement\par We discussed risk to T exposure to partners and children from transdermal appoaches.\par Will restart \par Labs in 3 weeks if able to go to lab in light of pandemic (patient does not want to go out)\par \par \par fu in 10 weeks\par \par 7.21.2020\par one packet of testosterone gel  TESTIM\par was not covered by insurance\par 120 dollars per month\par energy levels; sexual function good when on \par reemphasized transmission risk\par protocol reviewed \par likes it better than AVEED \par will get labs in 3 weeks since patient ran out\par fu in 4 weeks

## 2020-07-21 NOTE — HISTORY OF PRESENT ILLNESS
[Other Location: e.g. School (Enter Location, City,State)___] : at [unfilled], at the time of the visit. [Other Location: e.g. Home (Enter Location, City,State)___] : at [unfilled] [Verbal consent obtained from patient] : the patient, [unfilled] [FreeTextEntry1] : Patient referred by Dr Ankur Abel for sexual dysfunction.\par Patient complaining of loss of libido x 3 months.\par No evaluation.\par Erections generally good.\par One one occasion lost erection during SI.\par  2.5 (second marriage) years\par 2 step children; and one 1.5 year old son.\par Son with neutropenia (viral)\par Couple very anxious\par \par 4.26.2017\par patient took only 40 mg of sildenafil ; 4/10 rigidity\par One hour later had partial tumescence\par Sufficient for SI; experienced premature ejaculation; had SI\par mild nasal congestion otherwise no side effects\par \par 5. 2017 started on testim; no side effects; "felt amazing" for the first several days and then regressed towards pretreatment. Symptoms better than prior to treatment.\par \par 7.19.2017\par Continues on testim one tube daily; had difficulty getting 2 tubes daily; insurance covered brand name but not generic. Energy improved, sexual function with Sildenafil 100 mg; erections good, no prolonged erection. Pleased\par \par 10.19.2017\par again stopped T replacement\par insurance would pay for brand but not generic.\par has been off for several days\par sexual function ok on sildenafil \par \par 7.25.2018\par returns with diminished libido\par seen by internist and repeated T\par T 183\par free T 39.1 \par \par \par 2.13.2019 \par patient is now maintained on AVEED (3rd injection today)\par \par 6.26.2019\par patient maintained on AVEED\par missed injection cycle\par now complaining of loss of libido and energy\par had surgery for deviated septum \par \par 8.14.2019\par patient on AVEED and Arimidex\par Improved energy and sexual functon\par \par 11.13.2019\par patient returns for AVEED and management\par he has stopped  ARIMIDEX (ran out of)\par feeling less energetic during this cycle\par \par 1.30.2020\par patient returns for AVEED\par again is off cycle\par patient embarked on weigh reduction and has last 15 lbs\par \par 4.15.2020 \par The patient-doctor relationship has been established in a face to face fashion via real time video/audio HIPAA compliant communication using telemedicine software.  The patient's identity has been confirmed.  The patient was previously emailed a copy of the telemedicine consent.  They have had a chance to review and has now given verbal consent and has requested care to be assessed and treated through telemedicine and understands there maybe limitations in this process and they may need further follow up care in the office and or hospital settings.   \par THE FOLLOWING WAS READ TO AND CONSENTED TO BY PATIENT\par By virtue of my participation in this telehealth visit, I am consenting to receive care through telehealth. Telehealth is the use of electronic information and communication technologies by providers to deliver health care to patients at a distance. \par I understand that any care provided to me through [employer]’s telehealth application (“the [employer] kesha”) will incorporate security protocols to protect the privacy and security of my health information. If any other application is used to provide care to me, I understand that the technology may not contain appropriate security protocols to protect the privacy and security of my health information. My provider has explained to me the risks associated with the technology platforms that he or she is using to provide care to me. I acknowledge that there are potential risks associated with any technology used while obtaining care through telehealth, including, but not limited to, connectively interruptions, other technical difficulties, and unauthorized access by a third party to one’s health information. Despite these risks, I agree to participate in the telehealth encounter. \par I understand and agree that I or my healthcare provider may terminate a telehealth encounter at any time in the event of a technical malfunction. \par I also understand that my location determines where medicine is being practiced. As a result, I will inform my provider where I am located at the time of my telehealth visit. \par I understand that there may be costs associated with a telehealth visit. I agree that I am responsible for any fees associated with the telehealth services that I receive. \par This Informed Consent for Telehealth Services During a Public Health Emergency will remain in effect solely during the term of the public health emergency.\par \par Verbal consent given on Apr 15, 2020  10:30AM by Bran Mar from Mr. KRYSTIAN SANCHES \par  \par \par Last aveed January 30th\par patient is behind schedule\par anxiety related to COVID19\par sexual activity maintained\par \par 7.21.2020\par The patient-doctor relationship has been established in a face to face fashion via real time video/audio HIPAA compliant communication using telemedicine software.  The patient's identity has been confirmed.  The patient was previously emailed a copy of the telemedicine consent.  They have had a chance to review and has now given verbal consent and has requested care to be assessed and treated through telemedicine and understands there maybe limitations in this process and they may need further follow up care in the office and or hospital settings.   \par THE FOLLOWING WAS READ TO AND CONSENTED TO BY PATIENT\par By virtue of my participation in this telehealth visit, I am consenting to receive care through telehealth. Telehealth is the use of electronic information and communication technologies by providers to deliver health care to patients at a distance. \par I understand that any care provided to me through [employer]’s telehealth application (“the [employer] kesha”) will incorporate security protocols to protect the privacy and security of my health information. If any other application is used to provide care to me, I understand that the technology may not contain appropriate security protocols to protect the privacy and security of my health information. My provider has explained to me the risks associated with the technology platforms that he or she is using to provide care to me. I acknowledge that there are potential risks associated with any technology used while obtaining care through telehealth, including, but not limited to, connectively interruptions, other technical difficulties, and unauthorized access by a third party to one’s health information. Despite these risks, I agree to participate in the telehealth encounter. \par I understand and agree that I or my healthcare provider may terminate a telehealth encounter at any time in the event of a technical malfunction. \par I also understand that my location determines where medicine is being practiced. As a result, I will inform my provider where I am located at the time of my telehealth visit. \par I understand that there may be costs associated with a telehealth visit. I agree that I am responsible for any fees associated with the telehealth services that I receive. \par This Informed Consent for Telehealth Services During a Public Health Emergency will remain in effect solely during the term of the public health emergency.\par \par Verbal consent given on Jul 21, 2020  10:00AM by Bran Mar from Mr. KRYSTIAN SANCHES \par  \par patient utilizing testim one packet per day\par

## 2020-08-31 LAB
ESTRADIOL SERPL-MCNC: 71 PG/ML
HCT VFR BLD CALC: 50 %
HGB BLD-MCNC: 16.8 G/DL
TESTOST SERPL-MCNC: 663 NG/DL

## 2020-09-03 ENCOUNTER — APPOINTMENT (OUTPATIENT)
Dept: UROLOGY | Facility: CLINIC | Age: 50
End: 2020-09-03
Payer: COMMERCIAL

## 2020-09-03 DIAGNOSIS — Z00.00 ENCOUNTER FOR GENERAL ADULT MEDICAL EXAMINATION W/OUT ABNORMAL FINDINGS: ICD-10-CM

## 2020-09-03 PROCEDURE — 99213 OFFICE O/P EST LOW 20 MIN: CPT | Mod: 95

## 2020-09-03 NOTE — HISTORY OF PRESENT ILLNESS
[Other Location: e.g. School (Enter Location, City,State)___] : at [unfilled], at the time of the visit. [Medical Office: ([facility])___] : at the medical office located in  [Verbal consent obtained from patient] : the patient, [unfilled] [FreeTextEntry1] : Patient referred by Dr Ankur Abel for sexual dysfunction.\par Patient complaining of loss of libido x 3 months.\par No evaluation.\par Erections generally good.\par One one occasion lost erection during SI.\par  2.5 (second marriage) years\par 2 step children; and one 1.5 year old son.\par Son with neutropenia (viral)\par Couple very anxious\par \par 4.26.2017\par patient took only 40 mg of sildenafil ; 4/10 rigidity\par One hour later had partial tumescence\par Sufficient for SI; experienced premature ejaculation; had SI\par mild nasal congestion otherwise no side effects\par \par 5. 2017 started on testim; no side effects; "felt amazing" for the first several days and then regressed towards pretreatment. Symptoms better than prior to treatment.\par \par 7.19.2017\par Continues on testim one tube daily; had difficulty getting 2 tubes daily; insurance covered brand name but not generic. Energy improved, sexual function with Sildenafil 100 mg; erections good, no prolonged erection. Pleased\par \par 10.19.2017\par again stopped T replacement\par insurance would pay for brand but not generic.\par has been off for several days\par sexual function ok on sildenafil \par \par 7.25.2018\par returns with diminished libido\par seen by internist and repeated T\par T 183\par free T 39.1 \par \par \par 2.13.2019 \par patient is now maintained on AVEED (3rd injection today)\par \par 6.26.2019\par patient maintained on AVEED\par missed injection cycle\par now complaining of loss of libido and energy\par had surgery for deviated septum \par \par 8.14.2019\par patient on AVEED and Arimidex\par Improved energy and sexual functon\par \par 11.13.2019\par patient returns for AVEED and management\par he has stopped  ARIMIDEX (ran out of)\par feeling less energetic during this cycle\par \par 1.30.2020\par patient returns for AVEED\par again is off cycle\par patient embarked on weigh reduction and has last 15 lbs\par \par 4.15.2020 \par The patient-doctor relationship has been established in a face to face fashion via real time video/audio HIPAA compliant communication using telemedicine software.  The patient's identity has been confirmed.  The patient was previously emailed a copy of the telemedicine consent.  They have had a chance to review and has now given verbal consent and has requested care to be assessed and treated through telemedicine and understands there maybe limitations in this process and they may need further follow up care in the office and or hospital settings.   \par THE FOLLOWING WAS READ TO AND CONSENTED TO BY PATIENT\par By virtue of my participation in this telehealth visit, I am consenting to receive care through telehealth. Telehealth is the use of electronic information and communication technologies by providers to deliver health care to patients at a distance. \par I understand that any care provided to me through [employer]’s telehealth application (“the [employer] kesha”) will incorporate security protocols to protect the privacy and security of my health information. If any other application is used to provide care to me, I understand that the technology may not contain appropriate security protocols to protect the privacy and security of my health information. My provider has explained to me the risks associated with the technology platforms that he or she is using to provide care to me. I acknowledge that there are potential risks associated with any technology used while obtaining care through telehealth, including, but not limited to, connectively interruptions, other technical difficulties, and unauthorized access by a third party to one’s health information. Despite these risks, I agree to participate in the telehealth encounter. \par I understand and agree that I or my healthcare provider may terminate a telehealth encounter at any time in the event of a technical malfunction. \par I also understand that my location determines where medicine is being practiced. As a result, I will inform my provider where I am located at the time of my telehealth visit. \par I understand that there may be costs associated with a telehealth visit. I agree that I am responsible for any fees associated with the telehealth services that I receive. \par This Informed Consent for Telehealth Services During a Public Health Emergency will remain in effect solely during the term of the public health emergency.\par \par Verbal consent given on Apr 15, 2020  10:30AM by Bran Mar from Mr. KRYSTIAN SANCHES \par  \par \par Last aveed January 30th\par patient is behind schedule\par anxiety related to COVID19\par sexual activity maintained\par \par 7.21.2020\par The patient-doctor relationship has been established in a face to face fashion via real time video/audio HIPAA compliant communication using telemedicine software.  The patient's identity has been confirmed.  The patient was previously emailed a copy of the telemedicine consent.  They have had a chance to review and has now given verbal consent and has requested care to be assessed and treated through telemedicine and understands there maybe limitations in this process and they may need further follow up care in the office and or hospital settings.   \par THE FOLLOWING WAS READ TO AND CONSENTED TO BY PATIENT\par By virtue of my participation in this telehealth visit, I am consenting to receive care through telehealth. Telehealth is the use of electronic information and communication technologies by providers to deliver health care to patients at a distance. \par I understand that any care provided to me through [employer]’s telehealth application (“the [employer] kesha”) will incorporate security protocols to protect the privacy and security of my health information. If any other application is used to provide care to me, I understand that the technology may not contain appropriate security protocols to protect the privacy and security of my health information. My provider has explained to me the risks associated with the technology platforms that he or she is using to provide care to me. I acknowledge that there are potential risks associated with any technology used while obtaining care through telehealth, including, but not limited to, connectively interruptions, other technical difficulties, and unauthorized access by a third party to one’s health information. Despite these risks, I agree to participate in the telehealth encounter. \par I understand and agree that I or my healthcare provider may terminate a telehealth encounter at any time in the event of a technical malfunction. \par I also understand that my location determines where medicine is being practiced. As a result, I will inform my provider where I am located at the time of my telehealth visit. \par I understand that there may be costs associated with a telehealth visit. I agree that I am responsible for any fees associated with the telehealth services that I receive. \par This Informed Consent for Telehealth Services During a Public Health Emergency will remain in effect solely during the term of the public health emergency.\par \par Verbal consent given on Jul 21, 2020  10:00AM by Bran Mar from Mr. KRYSTIAN STAPLETONOUBARRY \par  \par \par \par 9.3.2020\par The patient-doctor relationship has been established in a face to face fashion via real time video/audio HIPAA compliant communication using telemedicine software.  The patient's identity has been confirmed.  The patient was previously emailed a copy of the telemedicine consent.  They have had a chance to review and has now given verbal consent and has requested care to be assessed and treated through telemedicine and understands there maybe limitations in this process and they may need further follow up care in the office and or hospital settings.   \par THE FOLLOWING WAS READ TO AND CONSENTED TO BY PATIENT\par By virtue of my participation in this telehealth visit, I am consenting to receive care through telehealth. Telehealth is the use of electronic information and communication technologies by providers to deliver health care to patients at a distance. \par I understand that any care provided to me through [employer]s telehealth application (“the [employer] kesha”) will incorporate security protocols to protect the privacy and security of my health information. If any other application is used to provide care to me, I understand that the technology may not contain appropriate security protocols to protect the privacy and security of my health information. My provider has explained to me the risks associated with the technology platforms that he or she is using to provide care to me. I acknowledge that there are potential risks associated with any technology used while obtaining care through telehealth, including, but not limited to, connectively interruptions, other technical difficulties, and unauthorized access by a third party to one’s health information. Despite these risks, I agree to participate in the telehealth encounter. \par I understand and agree that I or my healthcare provider may terminate a telehealth encounter at any time in the event of a technical malfunction. \par I also understand that my location determines where medicine is being practiced. As a result, I will inform my provider where I am located at the time of my telehealth visit. \par I understand that there may be costs associated with a telehealth visit. I agree that I am responsible for any fees associated with the telehealth services that I receive. \par This Informed Consent for Telehealth Services During a Public Health Emergency will remain in effect solely during the term of the public health emergency.\par \par Verbal consent given on Sep 03, 2020   3:30PM by Bran Mar from Mr. KRYSTIAN SANCHES \par  \par feeling good; energetic\par excellent erection\par premature ejaculation\par \par

## 2020-09-03 NOTE — ASSESSMENT
[FreeTextEntry1] : Patient had been on maintenance aveed\par Unable to continue because of pandemic\par Will restart on Testim\par \par We had an extensive discussion re Risks of T replacement\par We discussed risk to T exposure to partners and children from transdermal appoaches.\par Will restart \par Labs in 3 weeks if able to go to lab in light of pandemic (patient does not want to go out)\par \par \par fu in 10 weeks\par \par 7.21.2020\par one packet of testosterone gel  TESTIM\par was not covered by insurance\par 120 dollars per month\par energy levels; sexual function good when on \par reemphasized transmission risk\par protocol reviewed \par likes it better than AVEED \par will get labs in 3 weeks since patient ran out\par fu in 4 weeks\par \par 9.3.2020\par patient pleased with gel\par good libido and sense of well being\par excellent erection\par ? premature ejaculation\par labs reviewed\par discussed estradiol increase\par had been on Arimidex will restart\par labs in 3 weeks \par fu appt in 4 weeks

## 2020-12-09 LAB
ESTRADIOL SERPL-MCNC: 14 PG/ML
HCT VFR BLD CALC: 46.8 %
HGB BLD-MCNC: 16 G/DL
TESTOST SERPL-MCNC: 446 NG/DL

## 2020-12-10 ENCOUNTER — TRANSCRIPTION ENCOUNTER (OUTPATIENT)
Age: 50
End: 2020-12-10

## 2021-01-05 ENCOUNTER — APPOINTMENT (OUTPATIENT)
Dept: INTERNAL MEDICINE | Facility: CLINIC | Age: 51
End: 2021-01-05
Payer: COMMERCIAL

## 2021-01-05 ENCOUNTER — NON-APPOINTMENT (OUTPATIENT)
Age: 51
End: 2021-01-05

## 2021-01-05 VITALS
SYSTOLIC BLOOD PRESSURE: 124 MMHG | HEIGHT: 71 IN | HEART RATE: 80 BPM | TEMPERATURE: 98.6 F | WEIGHT: 281 LBS | RESPIRATION RATE: 16 BRPM | BODY MASS INDEX: 39.34 KG/M2 | OXYGEN SATURATION: 95 % | DIASTOLIC BLOOD PRESSURE: 80 MMHG

## 2021-01-05 DIAGNOSIS — Z82.49 FAMILY HISTORY OF ISCHEMIC HEART DISEASE AND OTHER DISEASES OF THE CIRCULATORY SYSTEM: ICD-10-CM

## 2021-01-05 DIAGNOSIS — Z87.09 PERSONAL HISTORY OF OTHER DISEASES OF THE RESPIRATORY SYSTEM: ICD-10-CM

## 2021-01-05 DIAGNOSIS — Z83.79 FAMILY HISTORY OF OTHER DISEASES OF THE DIGESTIVE SYSTEM: ICD-10-CM

## 2021-01-05 DIAGNOSIS — Z87.898 PERSONAL HISTORY OF OTHER SPECIFIED CONDITIONS: ICD-10-CM

## 2021-01-05 DIAGNOSIS — Z87.448 PERSONAL HISTORY OF OTHER DISEASES OF URINARY SYSTEM: ICD-10-CM

## 2021-01-05 DIAGNOSIS — R82.90 UNSPECIFIED ABNORMAL FINDINGS IN URINE: ICD-10-CM

## 2021-01-05 PROCEDURE — 99204 OFFICE O/P NEW MOD 45 MIN: CPT

## 2021-01-05 PROCEDURE — 99072 ADDL SUPL MATRL&STAF TM PHE: CPT

## 2021-01-05 NOTE — PLAN
[FreeTextEntry1] : Mr. Lange presents for initial physical examination. Prescription for comprehensive blood profiles and given to the patient. He will continue on testosterone supplementation as per his urologist. Patient is also on anastrozole for elevated acid levels. He takes Ambien for sleep. Followup in 6 months.

## 2021-01-05 NOTE — HEALTH RISK ASSESSMENT
[Yes] : Yes [2 - 3 times a week (3 pts)] : 2 - 3  times a week (3 points) [1 or 2 (0 pts)] : 1 or 2 (0 points) [Never (0 pts)] : Never (0 points) [No] : In the past 12 months have you used drugs other than those required for medical reasons? 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

## 2021-01-05 NOTE — HISTORY OF PRESENT ILLNESS
[FreeTextEntry1] : initial annual physical examination [de-identified] : Patient is a 50-year-old male who presents for initial physical examination. He has a history of low testosterone and insomnia. He is currently complaining of some sinus congestion. He denies any fevers or chills. Patient has occasional unproductive cough. He has no hematuria or dysuria. There is no history of blood per rectum.

## 2021-01-07 ENCOUNTER — APPOINTMENT (OUTPATIENT)
Dept: UROLOGY | Facility: CLINIC | Age: 51
End: 2021-01-07
Payer: COMMERCIAL

## 2021-01-07 VITALS — TEMPERATURE: 97.3 F

## 2021-01-07 PROCEDURE — 99214 OFFICE O/P EST MOD 30 MIN: CPT

## 2021-01-07 PROCEDURE — 99072 ADDL SUPL MATRL&STAF TM PHE: CPT

## 2021-01-07 NOTE — ASSESSMENT
[FreeTextEntry1] : Patient had been on maintenance aveed\par Unable to continue because of pandemic\par Will restart on Testim\par \par We had an extensive discussion re Risks of T replacement\par We discussed risk to T exposure to partners and children from transdermal appoaches.\par Will restart \par Labs in 3 weeks if able to go to lab in light of pandemic (patient does not want to go out)\par \par \par fu in 10 weeks\par \par 7.21.2020\par one packet of testosterone gel  TESTIM\par was not covered by insurance\par 120 dollars per month\par energy levels; sexual function good when on \par reemphasized transmission risk\par protocol reviewed \par likes it better than AVEED \par will get labs in 3 weeks since patient ran out\par fu in 4 weeks\par \par 9.3.2020\par patient pleased with gel\par good libido and sense of well being\par excellent erection\par ? premature ejaculation\par labs reviewed\par discussed estradiol increase\par had been on Arimidex will restart\par labs in 3 weeks \par fu appt in 4 weeks\par \par 1.7.2021\par patient has run out of Testosterone and arimidex\par labs from 12 .2000 reviewed\par excellent T/E2 and H/H\par will restart\par \par discussed premature ejaculation\par PREMATURE EJACULATION\par The patient and I discussed the guidelines for the treatment of premature ejaculation. The patient reports that his symptoms cause him  distress. Discussed squeeze technique and sensate focus approach  Patient endorses that he attempts to "distance' himself and then ejaculates. \par Second line therapy commonly includes SSRI's.\par The [facility]  expressed fully understanding of the information provided, the consequences and the management. .  He does not want to take additional medication and will attempt behavioural techniques\par \par Sexual dysfunction worse off oft T\par taking sildenafil\par continue current regimen\par \par Discussed PSA monitoring and controversy. \par Turning 51\par Patient wants to proceed,\par  \par \par labs in 3 months (h/h , PSA, T and Es\par followup in 3 months

## 2021-01-07 NOTE — HISTORY OF PRESENT ILLNESS
[FreeTextEntry1] : Patient referred by Dr Ankur Abel for sexual dysfunction.\par Patient complaining of loss of libido x 3 months.\par No evaluation.\par Erections generally good.\par One one occasion lost erection during SI.\par  2.5 (second marriage) years\par 2 step children; and one 1.5 year old son.\par Son with neutropenia (viral)\par Couple very anxious\par \par 4.26.2017\par patient took only 40 mg of sildenafil ; 4/10 rigidity\par One hour later had partial tumescence\par Sufficient for SI; experienced premature ejaculation; had SI\par mild nasal congestion otherwise no side effects\par \par 5. 2017 started on testim; no side effects; "felt amazing" for the first several days and then regressed towards pretreatment. Symptoms better than prior to treatment.\par \par 7.19.2017\par Continues on testim one tube daily; had difficulty getting 2 tubes daily; insurance covered brand name but not generic. Energy improved, sexual function with Sildenafil 100 mg; erections good, no prolonged erection. Pleased\par \par 10.19.2017\par again stopped T replacement\par insurance would pay for brand but not generic.\par has been off for several days\par sexual function ok on sildenafil \par \par 7.25.2018\par returns with diminished libido\par seen by internist and repeated T\par T 183\par free T 39.1 \par \par \par 2.13.2019 \par patient is now maintained on AVEED (3rd injection today)\par \par 6.26.2019\par patient maintained on AVEED\par missed injection cycle\par now complaining of loss of libido and energy\par had surgery for deviated septum \par \par 8.14.2019\par patient on AVEED and Arimidex\par Improved energy and sexual functon\par \par 11.13.2019\par patient returns for AVEED and management\par he has stopped  ARIMIDEX (ran out of)\par feeling less energetic during this cycle\par \par 1.30.2020\par patient returns for AVEED\par again is off cycle\par patient embarked on weigh reduction and has last 15 lbs\par \par 4.15.2020 \par The patient-doctor relationship has been established in a face to face fashion via real time video/audio HIPAA compliant communication using telemedicine software.  The patient's identity has been confirmed.  The patient was previously emailed a copy of the telemedicine consent.  They have had a chance to review and has now given verbal consent and has requested care to be assessed and treated through telemedicine and understands there maybe limitations in this process and they may need further follow up care in the office and or hospital settings.   \par THE FOLLOWING WAS READ TO AND CONSENTED TO BY PATIENT\par By virtue of my participation in this telehealth visit, I am consenting to receive care through telehealth. Telehealth is the use of electronic information and communication technologies by providers to deliver health care to patients at a distance. \par I understand that any care provided to me through [employer]’s telehealth application (“the [email or web address]”) will incorporate security protocols to protect the privacy and security of my health information. If any other application is used to provide care to me, I understand that the technology may not contain appropriate security protocols to protect the privacy and security of my health information. My provider has explained to me the risks associated with the technology platforms that he or she is using to provide care to me. I acknowledge that there are potential risks associated with any technology used while obtaining care through telehealth, including, but not limited to, connectively interruptions, other technical difficulties, and unauthorized access by a third party to one’s health information. Despite these risks, I agree to participate in the telehealth encounter. \par I understand and agree that I or my healthcare provider may terminate a telehealth encounter at any time in the event of a technical malfunction. \par I also understand that my location determines where medicine is being practiced. As a result, I will inform my provider where I am located at the time of my telehealth visit. \par I understand that there may be costs associated with a telehealth visit. I agree that I am responsible for any fees associated with the telehealth services that I receive. \par This Informed Consent for Telehealth Services During a Public Health Emergency will remain in effect solely during the term of the public health emergency.\par \par Verbal consent given on Apr 15, 2020  10:30AM by Bran Mar from Mr. KRYSTIAN SANCHES \par  \par \par Last aveed January 30th\par patient is behind schedule\par anxiety related to COVID19\par sexual activity maintained\par \par 7.21.2020\par The patient-doctor relationship has been established in a face to face fashion via real time video/audio HIPAA compliant communication using telemedicine software.  The patient's identity has been confirmed.  The patient was previously emailed a copy of the telemedicine consent.  They have had a chance to review and has now given verbal consent and has requested care to be assessed and treated through telemedicine and understands there maybe limitations in this process and they may need further follow up care in the office and or hospital settings.   \par THE FOLLOWING WAS READ TO AND CONSENTED TO BY PATIENT\par By virtue of my participation in this telehealth visit, I am consenting to receive care through telehealth. Telehealth is the use of electronic information and communication technologies by providers to deliver health care to patients at a distance. \par I understand that any care provided to me through [employer]’s telehealth application (“the [email or web address]”) will incorporate security protocols to protect the privacy and security of my health information. If any other application is used to provide care to me, I understand that the technology may not contain appropriate security protocols to protect the privacy and security of my health information. My provider has explained to me the risks associated with the technology platforms that he or she is using to provide care to me. I acknowledge that there are potential risks associated with any technology used while obtaining care through telehealth, including, but not limited to, connectively interruptions, other technical difficulties, and unauthorized access by a third party to one’s health information. Despite these risks, I agree to participate in the telehealth encounter. \par I understand and agree that I or my healthcare provider may terminate a telehealth encounter at any time in the event of a technical malfunction. \par I also understand that my location determines where medicine is being practiced. As a result, I will inform my provider where I am located at the time of my telehealth visit. \par I understand that there may be costs associated with a telehealth visit. I agree that I am responsible for any fees associated with the telehealth services that I receive. \par This Informed Consent for Telehealth Services During a Public Health Emergency will remain in effect solely during the term of the public health emergency.\par \par Verbal consent given on Jul 21, 2020  10:00AM by Bran Mar from Mr. KRYSTIAN STAPLETONOUBARRY \par  \par \par \par 9.3.2020\par The patient-doctor relationship has been established in a face to face fashion via real time video/audio HIPAA compliant communication using telemedicine software.  The patient's identity has been confirmed.  The patient was previously emailed a copy of the telemedicine consent.  They have had a chance to review and has now given verbal consent and has requested care to be assessed and treated through telemedicine and understands there maybe limitations in this process and they may need further follow up care in the office and or hospital settings.   \par THE FOLLOWING WAS READ TO AND CONSENTED TO BY PATIENT\par By virtue of my participation in this telehealth visit, I am consenting to receive care through telehealth. Telehealth is the use of electronic information and communication technologies by providers to deliver health care to patients at a distance. \par I understand that any care provided to me through [employer]’s telehealth application (“the [email or web address]”) will incorporate security protocols to protect the privacy and security of my health information. If any other application is used to provide care to me, I understand that the technology may not contain appropriate security protocols to protect the privacy and security of my health information. My provider has explained to me the risks associated with the technology platforms that he or she is using to provide care to me. I acknowledge that there are potential risks associated with any technology used while obtaining care through telehealth, including, but not limited to, connectively interruptions, other technical difficulties, and unauthorized access by a third party to one’s health information. Despite these risks, I agree to participate in the telehealth encounter. \par I understand and agree that I or my healthcare provider may terminate a telehealth encounter at any time in the event of a technical malfunction. \par I also understand that my location determines where medicine is being practiced. As a result, I will inform my provider where I am located at the time of my telehealth visit. \par I understand that there may be costs associated with a telehealth visit. I agree that I am responsible for any fees associated with the telehealth services that I receive. \par This Informed Consent for Telehealth Services During a Public Health Emergency will remain in effect solely during the term of the public health emergency.\par \par Verbal consent given on Sep 03, 2020   3:30PM by Bran Mar from Mr. KRYSTIAN MONOGIOUDIS \par  \par feeling good; energetic\par excellent erection\par premature ejaculation\par \par \par 1.7.2021\par returns has been on arimidex and one tube testim\par patient felt that he had not been doing as welll\par was feeling less energetic \par less interested in sexual function \par complaining premature ejaculation

## 2021-01-28 ENCOUNTER — EMERGENCY (EMERGENCY)
Facility: HOSPITAL | Age: 51
LOS: 0 days | Discharge: HOME | End: 2021-01-28
Attending: EMERGENCY MEDICINE
Payer: COMMERCIAL

## 2021-01-28 VITALS
RESPIRATION RATE: 16 BRPM | DIASTOLIC BLOOD PRESSURE: 88 MMHG | HEART RATE: 88 BPM | SYSTOLIC BLOOD PRESSURE: 128 MMHG | TEMPERATURE: 98 F | OXYGEN SATURATION: 95 %

## 2021-01-28 VITALS — WEIGHT: 225.09 LBS | HEIGHT: 71 IN

## 2021-01-28 DIAGNOSIS — S61.411A LACERATION WITHOUT FOREIGN BODY OF RIGHT HAND, INITIAL ENCOUNTER: ICD-10-CM

## 2021-01-28 DIAGNOSIS — Z79.899 OTHER LONG TERM (CURRENT) DRUG THERAPY: ICD-10-CM

## 2021-01-28 DIAGNOSIS — M75.101 UNSPECIFIED ROTATOR CUFF TEAR OR RUPTURE OF RIGHT SHOULDER, NOT SPECIFIED AS TRAUMATIC: Chronic | ICD-10-CM
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
## 2021-01-28 DIAGNOSIS — J45.909 UNSPECIFIED ASTHMA, UNCOMPLICATED: ICD-10-CM

## 2021-01-28 PROCEDURE — 73130 X-RAY EXAM OF HAND: CPT | Mod: 26,RT

## 2021-01-28 PROCEDURE — 99283 EMERGENCY DEPT VISIT LOW MDM: CPT

## 2021-01-28 PROCEDURE — 73130 X-RAY EXAM OF HAND: CPT | Mod: RT

## 2021-01-28 PROCEDURE — 12002 RPR S/N/AX/GEN/TRNK2.6-7.5CM: CPT

## 2021-01-28 PROCEDURE — 99283 EMERGENCY DEPT VISIT LOW MDM: CPT | Mod: 25

## 2021-01-28 NOTE — ED PROVIDER NOTE - PATIENT PORTAL LINK FT
You can access the FollowMyHealth Patient Portal offered by [facility] by registering at the following website: [email or web address]. By joining [employer]’s FollowMyHealth portal, you will also be able to view your health information using other applications (apps) compatible with our system.

## 2021-01-28 NOTE — ED ADULT NURSE NOTE - OBJECTIVE STATEMENT
pt presents to the ED with laceration to R hand from porcelain piggy bank. Bleeding controlled at this time. Pt states last tetanus was two years ago so up to date. Pt offers no other complaints. all safety measures in place at this time

## 2021-01-28 NOTE — ED PROVIDER NOTE - OBJECTIVE STATEMENT
49 y/o male in ED c/o lac to right hand from broken glass piggy bank x 1 hr.   pt states last tetanus 2 years ago.    pt denies any other complaints.    states able to move all extremities.

## 2021-01-28 NOTE — ED ADULT TRIAGE NOTE - CHIEF COMPLAINT QUOTE
pt c/o R hand laceration from smashing on porcelain piggy bank. bleeding controlled. wrapped with kerlix.

## 2021-01-28 NOTE — ED PROVIDER NOTE - NSFOLLOWUPINSTRUCTIONS_ED_ALL_ED_FT
please follow up with your doctor in 3-5 days for wound check.   please have sutures removed in 10-14 days.   keep wound clean and dry.   return to ED for any concerns.   take motrin and tylenol for pain.

## 2021-04-08 ENCOUNTER — APPOINTMENT (OUTPATIENT)
Dept: UROLOGY | Facility: CLINIC | Age: 51
End: 2021-04-08

## 2021-07-26 ENCOUNTER — APPOINTMENT (OUTPATIENT)
Dept: UROLOGY | Facility: CLINIC | Age: 51
End: 2021-07-26

## 2021-07-29 ENCOUNTER — APPOINTMENT (OUTPATIENT)
Dept: UROLOGY | Facility: CLINIC | Age: 51
End: 2021-07-29
Payer: COMMERCIAL

## 2021-07-29 DIAGNOSIS — D75.1 SECONDARY POLYCYTHEMIA: ICD-10-CM

## 2021-07-29 PROCEDURE — 99213 OFFICE O/P EST LOW 20 MIN: CPT | Mod: 95

## 2021-07-29 NOTE — ASSESSMENT
[FreeTextEntry1] : Patient had been on maintenance aveed\par Unable to continue because of pandemic\par Will restart on Testim\par \par We had an extensive discussion re Risks of T replacement\par We discussed risk to T exposure to partners and children from transdermal appoaches.\par Will restart \par Labs in 3 weeks if able to go to lab in light of pandemic (patient does not want to go out)\par \par \par fu in 10 weeks\par \par 7.21.2020\par one packet of testosterone gel  TESTIM\par was not covered by insurance\par 120 dollars per month\par energy levels; sexual function good when on \par reemphasized transmission risk\par protocol reviewed \par likes it better than AVEED \par will get labs in 3 weeks since patient ran out\par fu in 4 weeks\par \par 9.3.2020\par patient pleased with gel\par good libido and sense of well being\par excellent erection\par ? premature ejaculation\par labs reviewed\par discussed estradiol increase\par had been on Arimidex will restart\par labs in 3 weeks \par fu appt in 4 weeks\par \par 1.7.2021\par patient has run out of Testosterone and arimidex\par labs from 12 .2000 reviewed\par excellent T/E2 and H/H\par will restart\par \par discussed premature ejaculation\par PREMATURE EJACULATION\par The patient and I discussed the guidelines for the treatment of premature ejaculation. The patient reports that his symptoms cause him  distress. Discussed squeeze technique and sensate focus approach  Patient endorses that he attempts to "distance' himself and then ejaculates. \par Second line therapy commonly includes SSRI's.\par The NICK MONOGIOUDIS  expressed fully understanding of the information provided, the consequences and the management. .  He does not want to take additional medication and will attempt behavioural techniques\par \par Sexual dysfunction worse off oft T\par taking sildenafil\par continue current regimen\par \par Discussed PSA monitoring and controversy. \par Turning 51\par Patient wants to proceed,\par  \par \par labs in 3 months (h/h , PSA, T and Es\par followup in 3 months \par \par July 29, 2021\par Patient seen with telemedicine.\par \par He is maintained on testosterone 1% transdermal gel he has been doing well.  He felt that his premature ejaculation was better while on anastrozole.  He has not been on this. \par He does not feel premature ejaculation\par  He has not had bloods done.  He ran out of testosterone 1 week ago.  He feels that his sexual function has decreased.  His energy has decreased.  However he does feel well.\par \par He wishes to restart his testosterone supplementation.  We discussed the need for baseline blood studies.  We discussed his previous polycythemia after testosterone.\par \par We discussed PSA screening.  He wished to undergo PSA screening.\par \par Plan:\par 1.  Renewal of testosterone 50 mg / 5 g 1%\par 2.  Testosterone, PSA estradiol, hemoglobin hematocrit\par 3.  Office visit in 1 month with labs at that time

## 2021-07-29 NOTE — HISTORY OF PRESENT ILLNESS
[Home] : at home, [unfilled] , at the time of the visit. [Medical Office: ([facility])___] : at the medical office located in  [Verbal consent obtained from patient] : the patient, [unfilled] [FreeTextEntry1] : Patient referred by Dr Ankur Abel for sexual dysfunction.\par Patient complaining of loss of libido x 3 months.\par No evaluation.\par Erections generally good.\par One one occasion lost erection during SI.\par  2.5 (second marriage) years\par 2 step children; and one 1.5 year old son.\par Son with neutropenia (viral)\par Couple very anxious\par \par 4.26.2017\par patient took only 40 mg of sildenafil ; 4/10 rigidity\par One hour later had partial tumescence\par Sufficient for SI; experienced premature ejaculation; had SI\par mild nasal congestion otherwise no side effects\par \par 5. 2017 started on testim; no side effects; "felt amazing" for the first several days and then regressed towards pretreatment. Symptoms better than prior to treatment.\par \par 7.19.2017\par Continues on testim one tube daily; had difficulty getting 2 tubes daily; insurance covered brand name but not generic. Energy improved, sexual function with Sildenafil 100 mg; erections good, no prolonged erection. Pleased\par \par 10.19.2017\par again stopped T replacement\par insurance would pay for brand but not generic.\par has been off for several days\par sexual function ok on sildenafil \par \par 7.25.2018\par returns with diminished libido\par seen by internist and repeated T\par T 183\par free T 39.1 \par \par \par 2.13.2019 \par patient is now maintained on AVEED (3rd injection today)\par \par 6.26.2019\par patient maintained on AVEED\par missed injection cycle\par now complaining of loss of libido and energy\par had surgery for deviated septum \par \par 8.14.2019\par patient on AVEED and Arimidex\par Improved energy and sexual functon\par \par 11.13.2019\par patient returns for AVEED and management\par he has stopped  ARIMIDEX (ran out of)\par feeling less energetic during this cycle\par \par 1.30.2020\par patient returns for AVEED\par again is off cycle\par patient embarked on weigh reduction and has last 15 lbs\par \par 4.15.2020 \par The patient-doctor relationship has been established in a face to face fashion via real time video/audio HIPAA compliant communication using telemedicine software.  The patient's identity has been confirmed.  The patient was previously emailed a copy of the telemedicine consent.  They have had a chance to review and has now given verbal consent and has requested care to be assessed and treated through telemedicine and understands there maybe limitations in this process and they may need further follow up care in the office and or hospital settings.   \par THE FOLLOWING WAS READ TO AND CONSENTED TO BY PATIENT\par By virtue of my participation in this telehealth visit, I am consenting to receive care through telehealth. Telehealth is the use of electronic information and communication technologies by providers to deliver health care to patients at a distance. \par I understand that any care provided to me through [employer]’s telehealth application (“the Jeeran kesha”) will incorporate security protocols to protect the privacy and security of my health information. If any other application is used to provide care to me, I understand that the technology may not contain appropriate security protocols to protect the privacy and security of my health information. My provider has explained to me the risks associated with the technology platforms that he or she is using to provide care to me. I acknowledge that there are potential risks associated with any technology used while obtaining care through telehealth, including, but not limited to, connectively interruptions, other technical difficulties, and unauthorized access by a third party to one’s health information. Despite these risks, I agree to participate in the telehealth encounter. \par I understand and agree that I or my healthcare provider may terminate a telehealth encounter at any time in the event of a technical malfunction. \par I also understand that my location determines where medicine is being practiced. As a result, I will inform my provider where I am located at the time of my telehealth visit. \par I understand that there may be costs associated with a telehealth visit. I agree that I am responsible for any fees associated with the telehealth services that I receive. \par This Informed Consent for Telehealth Services During a Public Health Emergency will remain in effect solely during the term of the public health emergency.\par \par Verbal consent given on Apr 15, 2020  10:30AM by Bran Mar from Mr. KRYSTIAN SANCHES \par  \par \par Last aveed January 30th\par patient is behind schedule\par anxiety related to COVID19\par sexual activity maintained\par \par 7.21.2020\par The patient-doctor relationship has been established in a face to face fashion via real time video/audio HIPAA compliant communication using telemedicine software.  The patient's identity has been confirmed.  The patient was previously emailed a copy of the telemedicine consent.  They have had a chance to review and has now given verbal consent and has requested care to be assessed and treated through telemedicine and understands there maybe limitations in this process and they may need further follow up care in the office and or hospital settings.   \par THE FOLLOWING WAS READ TO AND CONSENTED TO BY PATIENT\par By virtue of my participation in this telehealth visit, I am consenting to receive care through telehealth. Telehealth is the use of electronic information and communication technologies by providers to deliver health care to patients at a distance. \par I understand that any care provided to me through [employer]’s telehealth application (“the Jeeran kesha”) will incorporate security protocols to protect the privacy and security of my health information. If any other application is used to provide care to me, I understand that the technology may not contain appropriate security protocols to protect the privacy and security of my health information. My provider has explained to me the risks associated with the technology platforms that he or she is using to provide care to me. I acknowledge that there are potential risks associated with any technology used while obtaining care through telehealth, including, but not limited to, connectively interruptions, other technical difficulties, and unauthorized access by a third party to one’s health information. Despite these risks, I agree to participate in the telehealth encounter. \par I understand and agree that I or my healthcare provider may terminate a telehealth encounter at any time in the event of a technical malfunction. \par I also understand that my location determines where medicine is being practiced. As a result, I will inform my provider where I am located at the time of my telehealth visit. \par I understand that there may be costs associated with a telehealth visit. I agree that I am responsible for any fees associated with the telehealth services that I receive. \par This Informed Consent for Telehealth Services During a Public Health Emergency will remain in effect solely during the term of the public health emergency.\par \par Verbal consent given on Jul 21, 2020  10:00AM by Bran Mar from Mr. KRYSTIAN STAPLETONOUBARRY \par  \par \par \par 9.3.2020\par The patient-doctor relationship has been established in a face to face fashion via real time video/audio HIPAA compliant communication using telemedicine software.  The patient's identity has been confirmed.  The patient was previously emailed a copy of the telemedicine consent.  They have had a chance to review and has now given verbal consent and has requested care to be assessed and treated through telemedicine and understands there maybe limitations in this process and they may need further follow up care in the office and or hospital settings.   \par THE FOLLOWING WAS READ TO AND CONSENTED TO BY PATIENT\par By virtue of my participation in this telehealth visit, I am consenting to receive care through telehealth. Telehealth is the use of electronic information and communication technologies by providers to deliver health care to patients at a distance. \par I understand that any care provided to me through [employer]’s telehealth application (“the Jeeran kesha”) will incorporate security protocols to protect the privacy and security of my health information. If any other application is used to provide care to me, I understand that the technology may not contain appropriate security protocols to protect the privacy and security of my health information. My provider has explained to me the risks associated with the technology platforms that he or she is using to provide care to me. I acknowledge that there are potential risks associated with any technology used while obtaining care through telehealth, including, but not limited to, connectively interruptions, other technical difficulties, and unauthorized access by a third party to one’s health information. Despite these risks, I agree to participate in the telehealth encounter. \par I understand and agree that I or my healthcare provider may terminate a telehealth encounter at any time in the event of a technical malfunction. \par I also understand that my location determines where medicine is being practiced. As a result, I will inform my provider where I am located at the time of my telehealth visit. \par I understand that there may be costs associated with a telehealth visit. I agree that I am responsible for any fees associated with the telehealth services that I receive. \par This Informed Consent for Telehealth Services During a Public Health Emergency will remain in effect solely during the term of the public health emergency.\par \par Verbal consent given on Sep 03, 2020   3:30PM by Bran Mar from Mr. KRYSTIAN GALVANGIOUDIS \par  \par feeling good; energetic\par excellent erection\par premature ejaculation\par \par \par 1.7.2021\par returns has been on arimidex and one tube testim\par patient felt that he had not been doing as welll\par was feeling less energetic \par less interested in sexual function \par complaining premature ejaculation\par \par 7.29.2021\par The patient-doctor relationship has been established in a face to face fashion via real time video/audio HIPAA compliant communication using telemedicine software.  The patient's identity has been confirmed.  The patient was previously emailed a copy of the telemedicine consent.  They have had a chance to review and has now given verbal consent and has requested care to be assessed and treated through telemedicine and understands there maybe limitations in this process and they may need further follow up care in the office and or hospital settings.   \par THE FOLLOWING WAS READ TO AND CONSENTED TO BY PATIENT\par By virtue of my participation in this telehealth visit, I am consenting to receive care through telehealth. Telehealth is the use of electronic information and communication technologies by providers to deliver health care to patients at a distance. \par I understand that any care provided to me through [employer]’s telehealth application (“the Jeeran kesha”) will incorporate security protocols to protect the privacy and security of my health information. If any other application is used to provide care to me, I understand that the technology may not contain appropriate security protocols to protect the privacy and security of my health information. My provider has explained to me the risks associated with the technology platforms that he or she is using to provide care to me. I acknowledge that there are potential risks associated with any technology used while obtaining care through telehealth, including, but not limited to, connectively interruptions, other technical difficulties, and unauthorized access by a third party to one’s health information. Despite these risks, I agree to participate in the telehealth encounter. \par I understand and agree that I or my healthcare provider may terminate a telehealth encounter at any time in the event of a technical malfunction. \par I also understand that my location determines where medicine is being practiced. As a result, I will inform my provider where I am located at the time of my telehealth visit. \par I understand that there may be costs associated with a telehealth visit. I agree that I am responsible for any fees associated with the telehealth services that I receive. \par This Informed Consent for Telehealth Services During a Public Health Emergency will remain in effect solely during the term of the public health emergency.\par \par Verbal consent given on Jul 29, 2021   3:00PM by Bran Mar from Eric KRYSTIAN VERÓNICA \par  \par \par \par

## 2021-08-13 ENCOUNTER — NON-APPOINTMENT (OUTPATIENT)
Age: 51
End: 2021-08-13

## 2021-08-13 ENCOUNTER — APPOINTMENT (OUTPATIENT)
Dept: CARDIOLOGY | Facility: CLINIC | Age: 51
End: 2021-08-13
Payer: COMMERCIAL

## 2021-08-13 VITALS
HEART RATE: 88 BPM | HEIGHT: 71 IN | DIASTOLIC BLOOD PRESSURE: 72 MMHG | TEMPERATURE: 97.6 F | WEIGHT: 287 LBS | SYSTOLIC BLOOD PRESSURE: 110 MMHG | OXYGEN SATURATION: 97 % | BODY MASS INDEX: 40.18 KG/M2

## 2021-08-13 VITALS — DIASTOLIC BLOOD PRESSURE: 70 MMHG | SYSTOLIC BLOOD PRESSURE: 116 MMHG

## 2021-08-13 PROCEDURE — 93000 ELECTROCARDIOGRAM COMPLETE: CPT | Mod: 59

## 2021-08-13 PROCEDURE — 99204 OFFICE O/P NEW MOD 45 MIN: CPT

## 2021-08-13 PROCEDURE — 99214 OFFICE O/P EST MOD 30 MIN: CPT

## 2021-08-13 RX ORDER — SILDENAFIL 20 MG/1
20 TABLET ORAL
Qty: 50 | Refills: 3 | Status: DISCONTINUED | COMMUNITY
Start: 2017-10-19 | End: 2021-08-13

## 2021-08-13 RX ORDER — ESZOPICLONE 3 MG/1
TABLET, COATED ORAL
Refills: 0 | Status: DISCONTINUED | COMMUNITY
End: 2021-08-13

## 2021-08-13 RX ORDER — ANASTROZOLE TABLETS 1 MG/1
1 TABLET ORAL
Qty: 30 | Refills: 1 | Status: DISCONTINUED | COMMUNITY
Start: 2020-09-03 | End: 2021-08-13

## 2021-08-13 RX ORDER — TESTOSTERONE 50 MG/5G
50 MG/5GM GEL TRANSDERMAL
Qty: 3 | Refills: 0 | Status: DISCONTINUED | COMMUNITY
Start: 2020-04-15 | End: 2021-08-13

## 2021-08-13 RX ORDER — SILDENAFIL 20 MG/1
20 TABLET ORAL
Qty: 30 | Refills: 1 | Status: DISCONTINUED | COMMUNITY
Start: 2017-04-19 | End: 2021-08-13

## 2021-08-13 RX ORDER — PHENTERMINE HYDROCHLORIDE 30 MG/1
30 CAPSULE ORAL
Refills: 0 | Status: DISCONTINUED | COMMUNITY
End: 2021-08-13

## 2021-08-13 NOTE — DISCUSSION/SUMMARY
[FreeTextEntry1] : 51M h/o morbid obesity (BMI 40), DON (not compliant with CPAP), HLD, prediabetes, insomnia, hypogonadism, low testosterone on replacement therapy, presents for cardiology evaluation. \par \par Patient with cardiac risk factors of obesity, prediabetes, borderline HLD and on chronic testosterone replacement, will check baseline echocardiogram and exercise treadmill stress test for functional capacity assessment, he is interested in CT calcium score scan which is appropriate given borderline HLD would help determine need for statin therapy, also recommended bariatric surgery evaluation he is agreeable as well. \par \par \par 1. Cardiac wellness exam- check EST And TTE with risk factors present. Await CT calcium score, if any degree of calcification would then add statin therapy. We have discussed importance of dieting and exercise. \par \par 2. Morbid obesity- referral for bariatric surgery evaluation. \par \par 3. DON- need follow up with sleep specialist to seek alternative treatment as not tolerating CPAP. \par \par \par \par Follow up in 3 months. \par

## 2021-08-13 NOTE — HISTORY OF PRESENT ILLNESS
[FreeTextEntry1] : 51M h/o morbid obesity (BMI 40), DON (not compliant with CPAP), HLD, prediabetes, insomnia, hypogonadism, low testosterone on replacement therapy, presents for cardiology evaluation. 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 Has Connor neuroma on his L-foot cannot do running type exercise. He is not able to keep CPAP on overnight due to sensation of claustrophobia. \par \par \par No CAD or stroke in family\par Former smoker, social alcohol\par Completed COVID vaccine\par \par \par Lipid 12/2020:\par , TG 68, HDL 57, \par A1c 6.0%

## 2021-08-13 NOTE — PHYSICAL EXAM

## 2021-08-14 ENCOUNTER — TRANSCRIPTION ENCOUNTER (OUTPATIENT)
Age: 51
End: 2021-08-14

## 2021-08-14 LAB
ESTRADIOL SERPL-MCNC: 37 PG/ML
HCT VFR BLD CALC: 47.6 %
HGB BLD-MCNC: 15.7 G/DL
PSA SERPL-MCNC: 0.95 NG/ML
TESTOST SERPL-MCNC: 447 NG/DL

## 2021-08-16 LAB
ALBUMIN SERPL ELPH-MCNC: 4.6 G/DL
ALP BLD-CCNC: 104 U/L
ALT SERPL-CCNC: 17 U/L
ANION GAP SERPL CALC-SCNC: 13 MMOL/L
APPEARANCE: CLEAR
AST SERPL-CCNC: 37 U/L
BACTERIA: NEGATIVE
BASOPHILS # BLD AUTO: 0.05 K/UL
BASOPHILS NFR BLD AUTO: 0.7 %
BILIRUB SERPL-MCNC: 0.3 MG/DL
BILIRUBIN URINE: NEGATIVE
BLOOD URINE: NEGATIVE
BUN SERPL-MCNC: 16 MG/DL
CALCIUM SERPL-MCNC: 9.8 MG/DL
CHLORIDE SERPL-SCNC: 103 MMOL/L
CHOLEST SERPL-MCNC: 183 MG/DL
CO2 SERPL-SCNC: 26 MMOL/L
COLOR: YELLOW
CREAT SERPL-MCNC: 0.95 MG/DL
EOSINOPHIL # BLD AUTO: 0.16 K/UL
EOSINOPHIL NFR BLD AUTO: 2.4 %
ESTIMATED AVERAGE GLUCOSE: 123 MG/DL
GLUCOSE QUALITATIVE U: NEGATIVE
GLUCOSE SERPL-MCNC: 111 MG/DL
HBA1C MFR BLD HPLC: 5.9 %
HCT VFR BLD CALC: 47.9 %
HDLC SERPL-MCNC: 49 MG/DL
HGB BLD-MCNC: 15.6 G/DL
HYALINE CASTS: 0 /LPF
IMM GRANULOCYTES NFR BLD AUTO: 0.1 %
IRON SATN MFR SERPL: 27 %
IRON SERPL-MCNC: 77 UG/DL
KETONES URINE: NEGATIVE
LDLC SERPL CALC-MCNC: 106 MG/DL
LEUKOCYTE ESTERASE URINE: NEGATIVE
LYMPHOCYTES # BLD AUTO: 2.19 K/UL
LYMPHOCYTES NFR BLD AUTO: 32.5 %
MAN DIFF?: NORMAL
MCHC RBC-ENTMCNC: 29.9 PG
MCHC RBC-ENTMCNC: 32.6 GM/DL
MCV RBC AUTO: 91.9 FL
MICROSCOPIC-UA: NORMAL
MONOCYTES # BLD AUTO: 0.49 K/UL
MONOCYTES NFR BLD AUTO: 7.3 %
NEUTROPHILS # BLD AUTO: 3.83 K/UL
NEUTROPHILS NFR BLD AUTO: 57 %
NITRITE URINE: NEGATIVE
NONHDLC SERPL-MCNC: 134 MG/DL
PH URINE: 6
PLATELET # BLD AUTO: 274 K/UL
POTASSIUM SERPL-SCNC: 4.7 MMOL/L
PROT SERPL-MCNC: 7.2 G/DL
PROTEIN URINE: NORMAL
PSA SERPL-MCNC: 0.96 NG/ML
RBC # BLD: 5.21 M/UL
RBC # FLD: 14.3 %
RED BLOOD CELLS URINE: 1 /HPF
SODIUM SERPL-SCNC: 141 MMOL/L
SPECIFIC GRAVITY URINE: 1.03
SQUAMOUS EPITHELIAL CELLS: 0 /HPF
TESTOST BND SERPL-MCNC: 16.6 PG/ML
TESTOSTERONE TOTAL S: 410 NG/DL
TIBC SERPL-MCNC: 292 UG/DL
TRIGL SERPL-MCNC: 141 MG/DL
TSH SERPL-ACNC: 1.39 UIU/ML
UIBC SERPL-MCNC: 214 UG/DL
UROBILINOGEN URINE: NORMAL
WBC # FLD AUTO: 6.73 K/UL
WHITE BLOOD CELLS URINE: 1 /HPF

## 2021-08-17 ENCOUNTER — NON-APPOINTMENT (OUTPATIENT)
Age: 51
End: 2021-08-17

## 2021-08-25 ENCOUNTER — APPOINTMENT (OUTPATIENT)
Dept: UROLOGY | Facility: CLINIC | Age: 51
End: 2021-08-25

## 2021-09-08 ENCOUNTER — APPOINTMENT (OUTPATIENT)
Dept: CARDIOLOGY | Facility: CLINIC | Age: 51
End: 2021-09-08

## 2021-09-21 ENCOUNTER — APPOINTMENT (OUTPATIENT)
Dept: CARDIOLOGY | Facility: CLINIC | Age: 51
End: 2021-09-21
Payer: COMMERCIAL

## 2021-09-21 ENCOUNTER — APPOINTMENT (OUTPATIENT)
Dept: UROLOGY | Facility: CLINIC | Age: 51
End: 2021-09-21
Payer: COMMERCIAL

## 2021-09-21 PROCEDURE — 93015 CV STRESS TEST SUPVJ I&R: CPT

## 2021-09-21 PROCEDURE — 93306 TTE W/DOPPLER COMPLETE: CPT

## 2021-09-21 PROCEDURE — 99213 OFFICE O/P EST LOW 20 MIN: CPT | Mod: 95

## 2021-09-21 RX ORDER — PERFLUTREN 6.52 MG/ML
6.52 INJECTION, SUSPENSION INTRAVENOUS
Qty: 2 | Refills: 0 | Status: COMPLETED | OUTPATIENT
Start: 2021-09-21

## 2021-09-21 RX ADMIN — PERFLUTREN MG/ML: 6.52 INJECTION, SUSPENSION INTRAVENOUS at 00:00

## 2021-09-21 NOTE — ASSESSMENT
[FreeTextEntry1] : Patient had been on maintenance aveed\par Unable to continue because of pandemic\par Will restart on Testim\par \par We had an extensive discussion re Risks of T replacement\par We discussed risk to T exposure to partners and children from transdermal appoaches.\par Will restart \par Labs in 3 weeks if able to go to lab in light of pandemic (patient does not want to go out)\par \par \par fu in 10 weeks\par \par 7.21.2020\par one packet of testosterone gel  TESTIM\par was not covered by insurance\par 120 dollars per month\par energy levels; sexual function good when on \par reemphasized transmission risk\par protocol reviewed \par likes it better than AVEED \par will get labs in 3 weeks since patient ran out\par fu in 4 weeks\par \par 9.3.2020\par patient pleased with gel\par good libido and sense of well being\par excellent erection\par ? premature ejaculation\par labs reviewed\par discussed estradiol increase\par had been on Arimidex will restart\par labs in 3 weeks \par fu appt in 4 weeks\par \par 1.7.2021\par patient has run out of Testosterone and arimidex\par labs from 12 .2000 reviewed\par excellent T/E2 and H/H\par will restart\par \par discussed premature ejaculation\par PREMATURE EJACULATION\par The patient and I discussed the guidelines for the treatment of premature ejaculation. The patient reports that his symptoms cause him  distress. Discussed squeeze technique and sensate focus approach  Patient endorses that he attempts to "distance' himself and then ejaculates. \par Second line therapy commonly includes SSRI's.\par The NICK MONOGIOUDIS  expressed fully understanding of the information provided, the consequences and the management. .  He does not want to take additional medication and will attempt behavioural techniques\par \par Sexual dysfunction worse off oft T\par taking sildenafil\par continue current regimen\par \par Discussed PSA monitoring and controversy. \par Turning 51\par Patient wants to proceed,\par  \par \par labs in 3 months (h/h , PSA, T and Es\par followup in 3 months \par \par July 29, 2021\par Patient seen with telemedicine.\par \par He is maintained on testosterone 1% transdermal gel he has been doing well.  He felt that his premature ejaculation was better while on anastrozole.  He has not been on this. \par He does not feel premature ejaculation\par  He has not had bloods done.  He ran out of testosterone 1 week ago.  He feels that his sexual function has decreased.  His energy has decreased.  However he does feel well.\par \par He wishes to restart his testosterone supplementation.  We discussed the need for baseline blood studies.  We discussed his previous polycythemia after testosterone.\par \par We discussed PSA screening.  He wished to undergo PSA screening.\par \par Plan:\par 1.  Renewal of testosterone 50 mg / 5 g 1%\par 2.  Testosterone, PSA estradiol, hemoglobin hematocrit\par 3.  Office visit in 1 month with labs at that time\par \par 9.21.2021\par patient does not feel he is doing as well as he had previously\par has not been taking arimidex\par utilizing Testosterone 50mg  %g 1%\par labs reviewed \par patient reports PSA 0.49\par normal testosterone and estradiol\par patient feels erection and energy are not as good\par discussed adding Arimidex but would recommend against in light of prior rise in Hbg/Hct

## 2021-09-21 NOTE — HISTORY OF PRESENT ILLNESS
[Other Location: e.g. School (Enter Location, City,State)___] : at [unfilled], at the time of the visit. [Medical Office: ([facility])___] : at the medical office located in  [Verbal consent obtained from patient] : the patient, [unfilled] [FreeTextEntry1] : Patient referred by Dr Ankur Abel for sexual dysfunction.\par Patient complaining of loss of libido x 3 months.\par No evaluation.\par Erections generally good.\par One one occasion lost erection during SI.\par  2.5 (second marriage) years\par 2 step children; and one 1.5 year old son.\par Son with neutropenia (viral)\par Couple very anxious\par \par 4.26.2017\par patient took only 40 mg of sildenafil ; 4/10 rigidity\par One hour later had partial tumescence\par Sufficient for SI; experienced premature ejaculation; had SI\par mild nasal congestion otherwise no side effects\par \par 5. 2017 started on testim; no side effects; "felt amazing" for the first several days and then regressed towards pretreatment. Symptoms better than prior to treatment.\par \par 7.19.2017\par Continues on testim one tube daily; had difficulty getting 2 tubes daily; insurance covered brand name but not generic. Energy improved, sexual function with Sildenafil 100 mg; erections good, no prolonged erection. Pleased\par \par 10.19.2017\par again stopped T replacement\par insurance would pay for brand but not generic.\par has been off for several days\par sexual function ok on sildenafil \par \par 7.25.2018\par returns with diminished libido\par seen by internist and repeated T\par T 183\par free T 39.1 \par \par \par 2.13.2019 \par patient is now maintained on AVEED (3rd injection today)\par \par 6.26.2019\par patient maintained on AVEED\par missed injection cycle\par now complaining of loss of libido and energy\par had surgery for deviated septum \par \par 8.14.2019\par patient on AVEED and Arimidex\par Improved energy and sexual functon\par \par 11.13.2019\par patient returns for AVEED and management\par he has stopped  ARIMIDEX (ran out of)\par feeling less energetic during this cycle\par \par 1.30.2020\par patient returns for AVEED\par again is off cycle\par patient embarked on weigh reduction and has last 15 lbs\par \par 4.15.2020 \par The patient-doctor relationship has been established in a face to face fashion via real time video/audio HIPAA compliant communication using telemedicine software.  The patient's identity has been confirmed.  The patient was previously emailed a copy of the telemedicine consent.  They have had a chance to review and has now given verbal consent and has requested care to be assessed and treated through telemedicine and understands there maybe limitations in this process and they may need further follow up care in the office and or hospital settings.   \par THE FOLLOWING WAS READ TO AND CONSENTED TO BY PATIENT\par By virtue of my participation in this telehealth visit, I am consenting to receive care through telehealth. Telehealth is the use of electronic information and communication technologies by providers to deliver health care to patients at a distance. \par I understand that any care provided to me through [employer]’s telehealth application (“the [employer] kesha”) will incorporate security protocols to protect the privacy and security of my health information. If any other application is used to provide care to me, I understand that the technology may not contain appropriate security protocols to protect the privacy and security of my health information. My provider has explained to me the risks associated with the technology platforms that he or she is using to provide care to me. I acknowledge that there are potential risks associated with any technology used while obtaining care through telehealth, including, but not limited to, connectively interruptions, other technical difficulties, and unauthorized access by a third party to one’s health information. Despite these risks, I agree to participate in the telehealth encounter. \par I understand and agree that I or my healthcare provider may terminate a telehealth encounter at any time in the event of a technical malfunction. \par I also understand that my location determines where medicine is being practiced. As a result, I will inform my provider where I am located at the time of my telehealth visit. \par I understand that there may be costs associated with a telehealth visit. I agree that I am responsible for any fees associated with the telehealth services that I receive. \par This Informed Consent for Telehealth Services During a Public Health Emergency will remain in effect solely during the term of the public health emergency.\par \par Verbal consent given on Apr 15, 2020  10:30AM by Bran Mar from Mr. KRYSTIAN SANCHES \par  \par \par Last aveed January 30th\par patient is behind schedule\par anxiety related to COVID19\par sexual activity maintained\par \par 7.21.2020\par The patient-doctor relationship has been established in a face to face fashion via real time video/audio HIPAA compliant communication using telemedicine software.  The patient's identity has been confirmed.  The patient was previously emailed a copy of the telemedicine consent.  They have had a chance to review and has now given verbal consent and has requested care to be assessed and treated through telemedicine and understands there maybe limitations in this process and they may need further follow up care in the office and or hospital settings.   \par THE FOLLOWING WAS READ TO AND CONSENTED TO BY PATIENT\par By virtue of my participation in this telehealth visit, I am consenting to receive care through telehealth. Telehealth is the use of electronic information and communication technologies by providers to deliver health care to patients at a distance. \par I understand that any care provided to me through [employer]’s telehealth application (“the [employer] kesha”) will incorporate security protocols to protect the privacy and security of my health information. If any other application is used to provide care to me, I understand that the technology may not contain appropriate security protocols to protect the privacy and security of my health information. My provider has explained to me the risks associated with the technology platforms that he or she is using to provide care to me. I acknowledge that there are potential risks associated with any technology used while obtaining care through telehealth, including, but not limited to, connectively interruptions, other technical difficulties, and unauthorized access by a third party to one’s health information. Despite these risks, I agree to participate in the telehealth encounter. \par I understand and agree that I or my healthcare provider may terminate a telehealth encounter at any time in the event of a technical malfunction. \par I also understand that my location determines where medicine is being practiced. As a result, I will inform my provider where I am located at the time of my telehealth visit. \par I understand that there may be costs associated with a telehealth visit. I agree that I am responsible for any fees associated with the telehealth services that I receive. \par This Informed Consent for Telehealth Services During a Public Health Emergency will remain in effect solely during the term of the public health emergency.\par \par Verbal consent given on Jul 21, 2020  10:00AM by Bran Mar from Mr. KRYSTIAN STAPLETONOUBARRY \par  \par \par \par 9.3.2020\par The patient-doctor relationship has been established in a face to face fashion via real time video/audio HIPAA compliant communication using telemedicine software.  The patient's identity has been confirmed.  The patient was previously emailed a copy of the telemedicine consent.  They have had a chance to review and has now given verbal consent and has requested care to be assessed and treated through telemedicine and understands there maybe limitations in this process and they may need further follow up care in the office and or hospital settings.   \par THE FOLLOWING WAS READ TO AND CONSENTED TO BY PATIENT\par By virtue of my participation in this telehealth visit, I am consenting to receive care through telehealth. Telehealth is the use of electronic information and communication technologies by providers to deliver health care to patients at a distance. \par I understand that any care provided to me through [employer]’s telehealth application (“the [employer] kesha”) will incorporate security protocols to protect the privacy and security of my health information. If any other application is used to provide care to me, I understand that the technology may not contain appropriate security protocols to protect the privacy and security of my health information. My provider has explained to me the risks associated with the technology platforms that he or she is using to provide care to me. I acknowledge that there are potential risks associated with any technology used while obtaining care through telehealth, including, but not limited to, connectively interruptions, other technical difficulties, and unauthorized access by a third party to one’s health information. Despite these risks, I agree to participate in the telehealth encounter. \par I understand and agree that I or my healthcare provider may terminate a telehealth encounter at any time in the event of a technical malfunction. \par I also understand that my location determines where medicine is being practiced. As a result, I will inform my provider where I am located at the time of my telehealth visit. \par I understand that there may be costs associated with a telehealth visit. I agree that I am responsible for any fees associated with the telehealth services that I receive. \par This Informed Consent for Telehealth Services During a Public Health Emergency will remain in effect solely during the term of the public health emergency.\par \par Verbal consent given on Sep 03, 2020   3:30PM by Bran Mar from Mr. KRYSTIAN GALVANGIOUDIS \par  \par feeling good; energetic\par excellent erection\par premature ejaculation\par \par \par 1.7.2021\par returns has been on arimidex and one tube testim\par patient felt that he had not been doing as welll\par was feeling less energetic \par less interested in sexual function \par complaining premature ejaculation\par \par 7.29.2021\par The patient-doctor relationship has been established in a face to face fashion via real time video/audio HIPAA compliant communication using telemedicine software.  The patient's identity has been confirmed.  The patient was previously emailed a copy of the telemedicine consent.  They have had a chance to review and has now given verbal consent and has requested care to be assessed and treated through telemedicine and understands there maybe limitations in this process and they may need further follow up care in the office and or hospital settings.   \par THE FOLLOWING WAS READ TO AND CONSENTED TO BY PATIENT\par By virtue of my participation in this telehealth visit, I am consenting to receive care through telehealth. Telehealth is the use of electronic information and communication technologies by providers to deliver health care to patients at a distance. \par I understand that any care provided to me through [employer]’s telehealth application (“the [employer] kesha”) will incorporate security protocols to protect the privacy and security of my health information. If any other application is used to provide care to me, I understand that the technology may not contain appropriate security protocols to protect the privacy and security of my health information. My provider has explained to me the risks associated with the technology platforms that he or she is using to provide care to me. I acknowledge that there are potential risks associated with any technology used while obtaining care through telehealth, including, but not limited to, connectively interruptions, other technical difficulties, and unauthorized access by a third party to one’s health information. Despite these risks, I agree to participate in the telehealth encounter. \par I understand and agree that I or my healthcare provider may terminate a telehealth encounter at any time in the event of a technical malfunction. \par I also understand that my location determines where medicine is being practiced. As a result, I will inform my provider where I am located at the time of my telehealth visit. \par I understand that there may be costs associated with a telehealth visit. I agree that I am responsible for any fees associated with the telehealth services that I receive. \par This Informed Consent for Telehealth Services During a Public Health Emergency will remain in effect solely during the term of the public health emergency.\par \par Verbal consent given on Jul 29, 2021   3:00PM by Bran Mar from Mr. KRYSTIAN SANCHES \par \par 9.21.2021\par The patient-doctor relationship has been established in a face to face fashion via real time video/audio HIPAA compliant communication using telemedicine software.  The patient's identity has been confirmed.  The patient was previously emailed a copy of the telemedicine consent.  They have had a chance to review and has now given verbal consent and has requested care to be assessed and treated through telemedicine and understands there maybe limitations in this process and they may need further follow up care in the office and or hospital settings.   \par THE FOLLOWING WAS READ TO AND CONSENTED TO BY PATIENT\par By virtue of my participation in this telehealth visit, I am consenting to receive care through telehealth. Telehealth is the use of electronic information and communication technologies by providers to deliver health care to patients at a distance. \par I understand that any care provided to me through [employer]’s telehealth application (“the [employer] kesha”) will incorporate security protocols to protect the privacy and security of my health information. If any other application is used to provide care to me, I understand that the technology may not contain appropriate security protocols to protect the privacy and security of my health information. My provider has explained to me the risks associated with the technology platforms that he or she is using to provide care to me. I acknowledge that there are potential risks associated with any technology used while obtaining care through telehealth, including, but not limited to, connectively interruptions, other technical difficulties, and unauthorized access by a third party to one’s health information. Despite these risks, I agree to participate in the telehealth encounter. \par I understand and agree that I or my healthcare provider may terminate a telehealth encounter at any time in the event of a technical malfunction. \par I also understand that my location determines where medicine is being practiced. As a result, I will inform my provider where I am located at the time of my telehealth visit. \par I understand that there may be costs associated with a telehealth visit. I agree that I am responsible for any fees associated with the telehealth services that I receive. \par This Informed Consent for Telehealth Services During a Public Health Emergency will remain in effect solely during the term of the public health emergency.\par \par Verbal consent given on Sep 21, 2021   2:30PM by Bran Mar from Mr. KRYSTIAN SANCHES \par  MULTIPLE ATTEMPTS AMWELL UNSUCCESSFUL\par [employer] with patient consent and awareness\par \par returns re management of hypogonadism\par \par \par \par \par  \par \par \par

## 2021-10-20 ENCOUNTER — APPOINTMENT (OUTPATIENT)
Dept: INTERNAL MEDICINE | Facility: CLINIC | Age: 51
End: 2021-10-20
Payer: COMMERCIAL

## 2021-10-20 VITALS
SYSTOLIC BLOOD PRESSURE: 124 MMHG | WEIGHT: 295 LBS | BODY MASS INDEX: 41.3 KG/M2 | RESPIRATION RATE: 16 BRPM | TEMPERATURE: 96.5 F | HEIGHT: 71 IN | HEART RATE: 88 BPM | OXYGEN SATURATION: 98 % | DIASTOLIC BLOOD PRESSURE: 70 MMHG

## 2021-10-20 PROCEDURE — 99213 OFFICE O/P EST LOW 20 MIN: CPT

## 2021-10-20 NOTE — HISTORY OF PRESENT ILLNESS
[FreeTextEntry1] : FU [de-identified] : Pt seen for initial visit last January.  Since that time he has been followed by Uro for low T and is now on Testim. Cardiology evaluwas obtained by .  ST and Echo done.  Told everything good.  He has been monitoring his BS on his parents BGM.  Readings were 110's usually.  2 days ago he had checked his BS fasting 156, later 124.  Yesterday am fasting 134, 122  at another time during day.  For the past few weeks he has been following a low carb diet.  Currently  Eats eggs in am with chicken,steak and salads. He has begun walking.   He has lost 7 lbs.\par He follows through at [facility]  - . Told of sleep apnea but has not been compliant  with treatment.

## 2021-10-20 NOTE — PHYSICAL EXAM
[No Respiratory Distress] : no respiratory distress  [Clear to Auscultation] : lungs were clear to auscultation bilaterally [Normal] : affect was normal and insight and judgment were intact [de-identified] : crowded [de-identified] : bull neck

## 2021-10-24 ENCOUNTER — NON-APPOINTMENT (OUTPATIENT)
Age: 51
End: 2021-10-24

## 2021-10-24 LAB
ANION GAP SERPL CALC-SCNC: 15 MMOL/L
BUN SERPL-MCNC: 11 MG/DL
CALCIUM SERPL-MCNC: 9.4 MG/DL
CHLORIDE SERPL-SCNC: 102 MMOL/L
CO2 SERPL-SCNC: 22 MMOL/L
CREAT SERPL-MCNC: 0.75 MG/DL
ESTIMATED AVERAGE GLUCOSE: 120 MG/DL
GLUCOSE SERPL-MCNC: 111 MG/DL
HBA1C MFR BLD HPLC: 5.8 %
POTASSIUM SERPL-SCNC: 4 MMOL/L
SODIUM SERPL-SCNC: 140 MMOL/L

## 2021-10-25 ENCOUNTER — NON-APPOINTMENT (OUTPATIENT)
Age: 51
End: 2021-10-25

## 2021-11-01 RX ORDER — BENZONATATE 100 MG/1
100 CAPSULE ORAL 3 TIMES DAILY
Qty: 15 | Refills: 0 | Status: COMPLETED | COMMUNITY
Start: 2021-11-01 | End: 2021-11-06

## 2021-11-02 ENCOUNTER — APPOINTMENT (OUTPATIENT)
Dept: INTERNAL MEDICINE | Facility: CLINIC | Age: 51
End: 2021-11-02
Payer: COMMERCIAL

## 2021-11-02 VITALS
OXYGEN SATURATION: 98 % | DIASTOLIC BLOOD PRESSURE: 90 MMHG | HEIGHT: 71 IN | TEMPERATURE: 98.2 F | WEIGHT: 301 LBS | BODY MASS INDEX: 42.14 KG/M2 | SYSTOLIC BLOOD PRESSURE: 138 MMHG | RESPIRATION RATE: 16 BRPM | HEART RATE: 90 BPM

## 2021-11-02 VITALS — SYSTOLIC BLOOD PRESSURE: 130 MMHG | DIASTOLIC BLOOD PRESSURE: 86 MMHG

## 2021-11-02 DIAGNOSIS — J20.9 ACUTE BRONCHITIS, UNSPECIFIED: ICD-10-CM

## 2021-11-02 DIAGNOSIS — R05.9 COUGH, UNSPECIFIED: ICD-10-CM

## 2021-11-02 PROCEDURE — 99213 OFFICE O/P EST LOW 20 MIN: CPT

## 2021-11-02 RX ORDER — TRAZODONE HYDROCHLORIDE 50 MG/1
50 TABLET ORAL
Refills: 0 | Status: DISCONTINUED | COMMUNITY
End: 2021-11-02

## 2021-11-02 NOTE — HISTORY OF PRESENT ILLNESS
[FreeTextEntry8] : Patient is a 51-year-old male with PMH of obesity, preDM2, HLD, sleep apnea presents to office today with complaint of cough x 3 weeks. Patient reports he was given a Z pack by his sleep doctor when cough started but never took it as he felt his cough was improving. He states he started zpack 2 days ago along with Combivent neb and feels some improvement. 1 episode of vomiting from cough. He has also been taking OTC cough medications with little improvement. Reports son recently sick with similar symptoms-COVID negative.

## 2021-11-02 NOTE — PLAN
[FreeTextEntry1] : 1. Continue with azithromycin- 3 additional pills sent to pharmacy as patient reports he vomited 2 pills from coughing. Start Promethazine codeine as needed for night time coughing. Tessalon Perles for day time coughing. Medrol pack sent vs prednisone taper as patient is concerned with elevated BS. To notify if no improvement in 3 days\par \par 2. Educated patient that is normal to see higher BS/ BP than usual while on medrol pack- should resolve once tx is completed\par \par 3. F/u in office with Dr. Latham 4/19/21

## 2021-11-02 NOTE — PHYSICAL EXAM
[No Acute Distress] : no acute distress [Well Nourished] : well nourished [Well Developed] : well developed [Normal Sclera/Conjunctiva] : normal sclera/conjunctiva [PERRL] : pupils equal round and reactive to light [No Lymphadenopathy] : no lymphadenopathy [Supple] : supple [Normal Rate] : normal rate  [Regular Rhythm] : with a regular rhythm [Normal S1, S2] : normal S1 and S2 [No Edema] : there was no peripheral edema [Soft] : abdomen soft [Non Tender] : non-tender [Non-distended] : non-distended [Normal Bowel Sounds] : normal bowel sounds [de-identified] : +Erythema B/L nasal mucosa [de-identified] : +mild Rhonchi b/l LL

## 2021-11-23 ENCOUNTER — APPOINTMENT (OUTPATIENT)
Dept: CARDIOLOGY | Facility: CLINIC | Age: 51
End: 2021-11-23

## 2021-12-14 DIAGNOSIS — Z01.818 ENCOUNTER FOR OTHER PREPROCEDURAL EXAMINATION: ICD-10-CM

## 2021-12-16 ENCOUNTER — APPOINTMENT (OUTPATIENT)
Dept: SURGERY | Facility: CLINIC | Age: 51
End: 2021-12-16
Payer: COMMERCIAL

## 2021-12-16 VITALS
WEIGHT: 285.38 LBS | RESPIRATION RATE: 15 BRPM | DIASTOLIC BLOOD PRESSURE: 78 MMHG | HEIGHT: 70 IN | HEART RATE: 79 BPM | OXYGEN SATURATION: 96 % | TEMPERATURE: 97.4 F | SYSTOLIC BLOOD PRESSURE: 118 MMHG | BODY MASS INDEX: 40.86 KG/M2

## 2021-12-16 DIAGNOSIS — R73.03 PREDIABETES.: ICD-10-CM

## 2021-12-16 DIAGNOSIS — G57.60 LESION OF PLANTAR NERVE, UNSPECIFIED LOWER LIMB: ICD-10-CM

## 2021-12-16 DIAGNOSIS — E78.00 PURE HYPERCHOLESTEROLEMIA, UNSPECIFIED: ICD-10-CM

## 2021-12-16 DIAGNOSIS — J45.909 UNSPECIFIED ASTHMA, UNCOMPLICATED: ICD-10-CM

## 2021-12-16 PROCEDURE — 99205 OFFICE O/P NEW HI 60 MIN: CPT

## 2021-12-16 NOTE — CONSULT LETTER
[Dear  ___] : Dear  [unfilled], [Consult Letter:] : I had the pleasure of evaluating your patient, [unfilled]. [Please see my note below.] : Please see my note below. [Consult Closing:] : Thank you very much for allowing me to participate in the care of this patient.  If you have any questions, please do not hesitate to contact me. [Sincerely,] : Sincerely, [FreeTextEntry3] : Will Fuller MD, FACS, FASMBS\par , Department of Surgery [facility]\par Director of Metabolic and Bariatric Surgery [facility]\par Director of Metabolic and Bariatric Surgery, and Robotic Minimally Invasive Surgery at [facility] [DrEric  ___] : Dr. GARCÍA [DrEric ___] : Dr. GARCÍA

## 2021-12-16 NOTE — PHYSICAL EXAM
[Obese, well nourished, in no acute distress] : obese, well nourished, in no acute distress [Normal] : affect appropriate [de-identified] : Normoactive bowel sounds, no hepatosplenomegaly, no masses, non-tender.

## 2021-12-16 NOTE — ASSESSMENT
[FreeTextEntry1] : The patient is a morbidly obese man, (BMI= 41), with significant weight related comorbidity including: Obstructive sleep apnea, prediabetes, reactive airways disease, history of kidney stones, hyperlipidemia; unable to lose weight and improve his co-morbid conditions with medical management including diet, exercise and weight loss medication.

## 2021-12-16 NOTE — HISTORY OF PRESENT ILLNESS
[de-identified] : The patient is a 51 year-old morbidly obese man, 5 feet 10 inches, 285 lbs. (BMI=41). The patient presents requesting weight loss surgery. He has been more than 100 lbs. overweight for the past 20 years and is currently 20 lbs less than his greatest weight. KRYSTIAN has lost up to 25 lbs. on more than one occasion. \par \par The patient has tried numerous weight loss programs including [facility], Diet center, Slimfast, low calorie diet, Atkins & self-directed and physician supervised diets. KRYSTIAN is taking Phentermine weight loss medication. \par \par The patient reports PREDIABETES, denies shortness of breath with exertion, denies weight bearing joint pain, denies lower back pain. He has OBSTRUCTIVE SLEEP APNEA (reports not using CPAP). He has DIFFICULTY SLEEPING. He denies kidney, urinary tract disease, incontinence. He reports erectile dysfunction. He denies headache, dizziness, seizure or neurological disorders. \par He denies untreated thyroid, adrenal, pituitary disease. The patient denies depression or psychiatric disorders. The patient denies gallstones, denies heartburn, denies ulcers & denies liver disease. \par He denies high blood pressure, has HIGH CHOLESTEROL, denies history of heart attack or stroke. He denies anemia, denies bleeding disorders, thrombosis, clotting disorder or easy bruisability. The patient denies peripheral edema. \par \par

## 2021-12-17 PROBLEM — G57.60 MORTON'S NEUROMA: Status: ACTIVE | Noted: 2021-12-17

## 2021-12-17 PROBLEM — J45.909 ASTHMA: Status: ACTIVE | Noted: 2021-12-17

## 2021-12-17 PROBLEM — E78.00 HIGH CHOLESTEROL: Status: ACTIVE | Noted: 2021-12-17

## 2021-12-17 PROBLEM — R73.03 PREDIABETES: Status: RESOLVED | Noted: 2021-08-13 | Resolved: 2021-12-17

## 2021-12-21 ENCOUNTER — APPOINTMENT (OUTPATIENT)
Dept: SURGERY | Facility: CLINIC | Age: 51
End: 2021-12-21

## 2022-01-04 ENCOUNTER — APPOINTMENT (OUTPATIENT)
Dept: UROLOGY | Facility: CLINIC | Age: 52
End: 2022-01-04
Payer: COMMERCIAL

## 2022-01-04 ENCOUNTER — APPOINTMENT (OUTPATIENT)
Dept: UROLOGY | Facility: CLINIC | Age: 52
End: 2022-01-04

## 2022-01-04 PROCEDURE — 99213 OFFICE O/P EST LOW 20 MIN: CPT | Mod: 95

## 2022-01-04 NOTE — HISTORY OF PRESENT ILLNESS
[Other Location: e.g. School (Enter Location, City,State)___] : at [unfilled], at the time of the visit. [Medical Office: ([facility])___] : at the medical office located in  [Verbal consent obtained from patient] : the patient, [unfilled] [FreeTextEntry1] : Patient referred by Dr Ankur Abel for sexual dysfunction.\par Patient complaining of loss of libido x 3 months.\par No evaluation.\par Erections generally good.\par One one occasion lost erection during SI.\par  2.5 (second marriage) years\par 2 step children; and one 1.5 year old son.\par Son with neutropenia (viral)\par Couple very anxious\par \par 4.26.2017\par patient took only 40 mg of sildenafil ; 4/10 rigidity\par One hour later had partial tumescence\par Sufficient for SI; experienced premature ejaculation; had SI\par mild nasal congestion otherwise no side effects\par \par 5. 2017 started on testim; no side effects; "felt amazing" for the first several days and then regressed towards pretreatment. Symptoms better than prior to treatment.\par \par 7.19.2017\par Continues on testim one tube daily; had difficulty getting 2 tubes daily; insurance covered brand name but not generic. Energy improved, sexual function with Sildenafil 100 mg; erections good, no prolonged erection. Pleased\par \par 10.19.2017\par again stopped T replacement\par insurance would pay for brand but not generic.\par has been off for several days\par sexual function ok on sildenafil \par \par 7.25.2018\par returns with diminished libido\par seen by internist and repeated T\par T 183\par free T 39.1 \par \par \par 2.13.2019 \par patient is now maintained on AVEED (3rd injection today)\par \par 6.26.2019\par patient maintained on AVEED\par missed injection cycle\par now complaining of loss of libido and energy\par had surgery for deviated septum \par \par 8.14.2019\par patient on AVEED and Arimidex\par Improved energy and sexual functon\par \par 11.13.2019\par patient returns for AVEED and management\par he has stopped  ARIMIDEX (ran out of)\par feeling less energetic during this cycle\par \par 1.30.2020\par patient returns for AVEED\par again is off cycle\par patient embarked on weigh reduction and has last 15 lbs\par \par 4.15.2020 \par The patient-doctor relationship has been established in a face to face fashion via real time video/audio HIPAA compliant communication using telemedicine software.  The patient's identity has been confirmed.  The patient was previously emailed a copy of the telemedicine consent.  They have had a chance to review and has now given verbal consent and has requested care to be assessed and treated through telemedicine and understands there maybe limitations in this process and they may need further follow up care in the office and or hospital settings.   \par THE FOLLOWING WAS READ TO AND CONSENTED TO BY PATIENT\par By virtue of my participation in this telehealth visit, I am consenting to receive care through telehealth. Telehealth is the use of electronic information and communication technologies by providers to deliver health care to patients at a distance. \par I understand that any care provided to me through [employer]’s telehealth application (“the [employer] k[employer]ha”) will incorporate security protocols to protect the privacy and security of my health information. If any other application is used to provide care to me, I understand that the technology may not contain appropriate security protocols to protect the privacy and security of my health information. My provider has explained to me the risks associated with the technology platforms that he or she is using to provide care to me. I acknowledge that there are potential risks associated with any technology used while obtaining care through telehealth, including, but not limited to, connectively interruptions, other technical difficulties, and unauthorized access by a third party to one’s health information. Despite these risks, I agree to participate in the telehealth encounter. \par I understand and agree that I or my healthcare provider may terminate a telehealth encounter at any time in the event of a technical malfunction. \par I also understand that my location determines where medicine is being practiced. As a result, I will inform my provider where I am located at the time of my telehealth visit. \par I understand that there may be costs associated with a telehealth visit. I agree that I am responsible for any fees associated with the telehealth services that I receive. \par This Informed Consent for Telehealth Services During a Public Health Emergency will remain in effect solely during the term of the public health emergency.\par \par Verbal consent given on Apr 15, 2020  10:30AM by Bran Mar from Mr. KRYSTIAN SANCHES \par  \par \par Last aveed January 30th\par patient is behind schedule\par anxiety related to COVID19\par sexual activity maintained\par \par 7.21.2020\par The patient-doctor relationship has been established in a face to face fashion via real time video/audio HIPAA compliant communication using telemedicine software.  The patient's identity has been confirmed.  The patient was previously emailed a copy of the telemedicine consent.  They have had a chance to review and has now given verbal consent and has requested care to be assessed and treated through telemedicine and understands there maybe limitations in this process and they may need further follow up care in the office and or hospital settings.   \par THE FOLLOWING WAS READ TO AND CONSENTED TO BY PATIENT\par By virtue of my participation in this telehealth visit, I am consenting to receive care through telehealth. Telehealth is the use of electronic information and communication technologies by providers to deliver health care to patients at a distance. \par I understand that any care provided to me through [employer]’s telehealth application (“the [employer]”) will incorporate security protocols to protect the privacy and security of my health information. If any other application is used to provide care to me, I understand that the technology may not contain appropriate security protocols to protect the privacy and security of my health information. My provider has explained to me the risks associated with the technology platforms that he or she is using to provide care to me. I acknowledge that there are potential risks associated with any technology used while obtaining care through telehealth, including, but not limited to, connectively interruptions, other technical difficulties, and unauthorized access by a third party to one’s health information. Despite these risks, I agree to participate in the telehealth encounter. \par I understand and agree that I or my healthcare provider may terminate a telehealth encounter at any time in the event of a technical malfunction. \par I also understand that my location determines where medicine is being practiced. As a result, I will inform my provider where I am located at the time of my telehealth visit. \par I understand that there may be costs associated with a telehealth visit. I agree that I am responsible for any fees associated with the telehealth services that I receive. \par This Informed Consent for Telehealth Services During a Public Health Emergency will remain in effect solely during the term of the public health emergency.\par \par Verbal consent given on Jul 21, 2020  10:00AM by Bran Mar from Mr. KRYSTIAN STAPLETONOUBARRY \par  \par \par \par 9.3.2020\par The patient-doctor relationship has been established in a face to face fashion via real time video/audio HIPAA compliant communication using telemedicine software.  The patient's identity has been confirmed.  The patient was previously emailed a copy of the telemedicine consent.  They have had a chance to review and has now given verbal consent and has requested care to be assessed and treated through telemedicine and understands there maybe limitations in this process and they may need further follow up care in the office and or hospital settings.   \par THE FOLLOWING WAS READ TO AND CONSENTED TO BY PATIENT\par By virtue of my participation in this telehealth visit, I am consenting to receive care through telehealth. Telehealth is the use of electronic information and communication technologies by providers to deliver health care to patients at a distance. \par I understand that any care provided to me through [employer]’s telehealth application (“the [employer]”) will incorporate security protocols to protect the privacy and security of my health information. If any other application is used to provide care to me, I understand that the technology may not contain appropriate security protocols to protect the privacy and security of my health information. My provider has explained to me the risks associated with the technology platforms that he or she is using to provide care to me. I acknowledge that there are potential risks associated with any technology used while obtaining care through telehealth, including, but not limited to, connectively interruptions, other technical difficulties, and unauthorized access by a third party to one’s health information. Despite these risks, I agree to participate in the telehealth encounter. \par I understand and agree that I or my healthcare provider may terminate a telehealth encounter at any time in the event of a technical malfunction. \par I also understand that my location determines where medicine is being practiced. As a result, I will inform my provider where I am located at the time of my telehealth visit. \par I understand that there may be costs associated with a telehealth visit. I agree that I am responsible for any fees associated with the telehealth services that I receive. \par This Informed Consent for Telehealth Services During a Public Health Emergency will remain in effect solely during the term of the public health emergency.\par \par Verbal consent given on Sep 03, 2020   3:30PM by Bran Mar from Mr. KRYSTIAN GALVANGIOUDIS \par  \par feeling good; energetic\par excellent erection\par premature ejaculation\par \par \par 1.7.2021\par returns has been on arimidex and one tube testim\par patient felt that he had not been doing as welll\par was feeling less energetic \par less interested in sexual function \par complaining premature ejaculation\par \par 7.29.2021\par The patient-doctor relationship has been established in a face to face fashion via real time video/audio HIPAA compliant communication using telemedicine software.  The patient's identity has been confirmed.  The patient was previously emailed a copy of the telemedicine consent.  They have had a chance to review and has now given verbal consent and has requested care to be assessed and treated through telemedicine and understands there maybe limitations in this process and they may need further follow up care in the office and or hospital settings.   \par THE FOLLOWING WAS READ TO AND CONSENTED TO BY PATIENT\par By virtue of my participation in this telehealth visit, I am consenting to receive care through telehealth. Telehealth is the use of electronic information and communication technologies by providers to deliver health care to patients at a distance. \par I understand that any care provided to me through [employer]s telehealth application (“the [employer]”) will incorporate security protocols to protect the privacy and security of my health information. If any other application is used to provide care to me, I understand that the technology may not contain appropriate security protocols to protect the privacy and security of my health information. My provider has explained to me the risks associated with the technology platforms that he or she is using to provide care to me. I acknowledge that there are potential risks associated with any technology used while obtaining care through telehealth, including, but not limited to, connectively interruptions, other technical difficulties, and unauthorized access by a third party to one’s health information. Despite these risks, I agree to participate in the telehealth encounter. \par I understand and agree that I or my healthcare provider may terminate a telehealth encounter at any time in the event of a technical malfunction. \par I also understand that my location determines where medicine is being practiced. As a result, I will inform my provider where I am located at the time of my telehealth visit. \par I understand that there may be costs associated with a telehealth visit. I agree that I am responsible for any fees associated with the telehealth services that I receive. \par This Informed Consent for Telehealth Services During a Public Health Emergency will remain in effect solely during the term of the public health emergency.\par \par Verbal consent given on Jul 29, 2021   3:00PM by Bran Mar from Mr. KRYSTIAN SANCHES \par \par 9.21.2021\par The patient-doctor relationship has been established in a face to face fashion via real time video/audio HIPAA compliant communication using telemedicine software.  The patient's identity has been confirmed.  The patient was previously emailed a copy of the telemedicine consent.  They have had a chance to review and has now given verbal consent and has requested care to be assessed and treated through telemedicine and understands there maybe limitations in this process and they may need further follow up care in the office and or hospital settings.   \par THE FOLLOWING WAS READ TO AND CONSENTED TO BY PATIENT\par By virtue of my participation in this telehealth visit, I am consenting to receive care through telehealth. Telehealth is the use of electronic information and communication technologies by providers to deliver health care to patients at a distance. \par I understand that any care provided to me through [employer]s telehealth application (“the [employer]”) will incorporate security protocols to protect the privacy and security of my health information. If any other application is used to provide care to me, I understand that the technology may not contain appropriate security protocols to protect the privacy and security of my health information. My provider has explained to me the risks associated with the technology platforms that he or she is using to provide care to me. I acknowledge that there are potential risks associated with any technology used while obtaining care through telehealth, including, but not limited to, connectively interruptions, other technical difficulties, and unauthorized access by a third party to one’s health information. Despite these risks, I agree to participate in the telehealth encounter. \par I understand and agree that I or my healthcare provider may terminate a telehealth encounter at any time in the event of a technical malfunction. \par I also understand that my location determines where medicine is being practiced. As a result, I will inform my provider where I am located at the time of my telehealth visit. \par I understand that there may be costs associated with a telehealth visit. I agree that I am responsible for any fees associated with the telehealth services that I receive. \par This Informed Consent for Telehealth Services During a Public Health Emergency will remain in effect solely during the term of the public health emergency.\par \par Verbal consent given on Sep 21, 2021   2:30PM by Bran Mar from Mr. KRYSTIAN SANCHES \par  MULTIPLE ATTEMPTS AMWELL UNSUCCESSFUL\par [employer] with patient consent and awareness\par \par returns re management of hypogonadism\par \par 1.4.22\par  \par The patient-doctor relationship has been established in a face to face fashion via real time video/audio HIPAA compliant communication using telemedicine software.  The patient's identity has been confirmed.  The patient was previously emailed a copy of the telemedicine consent.  They have had a chance to review and has now given verbal consent and has requested care to be assessed and treated through telemedicine and understands there maybe limitations in this process and they may need further follow up care in the office and or hospital settings.   \par THE FOLLOWING WAS READ TO AND CONSENTED TO BY PATIENT\par By virtue of my participation in this telehealth visit, I am consenting to receive care through telehealth. Telehealth is the use of electronic information and communication technologies by providers to deliver health care to patients at a distance. \par I understand that any care provided to me through [employer]’s telehealth application (“the [employer]”) will incorporate security protocols to protect the privacy and security of my health information. If any other application is used to provide care to me, I understand that the technology may not contain appropriate security protocols to protect the privacy and security of my health information. My provider has explained to me the risks associated with the technology platforms that he or she is using to provide care to me. I acknowledge that there are potential risks associated with any technology used while obtaining care through telehealth, including, but not limited to, connectively interruptions, other technical difficulties, and unauthorized access by a third party to one’s health information. Despite these risks, I agree to participate in the telehealth encounter. \par I understand and agree that I or my healthcare provider may terminate a telehealth encounter at any time in the event of a technical malfunction. \par I also understand that my location determines where medicine is being practiced. As a result, I will inform my provider where I am located at the time of my telehealth visit. \par I understand that there may be costs associated with a telehealth visit. I agree that I am responsible for any fees associated with the telehealth services that I receive. \par This Informed Consent for Telehealth Services During a Public Health Emergency will remain in effect solely during the term of the public health emergency.\par \par Returns for follow-up\par \par Verbal consent given on Jan 4, 2022  2:30PM by Bran Mar from Mr. KRYSTIAN SANCHES \par \par \par

## 2022-01-04 NOTE — ASSESSMENT
[FreeTextEntry1] : Patient had been on maintenance aveed\par Unable to continue because of pandemic\par Will restart on Testim\par \par We had an extensive discussion re Risks of T replacement\par We discussed risk to T exposure to partners and children from transdermal appoaches.\par Will restart \par Labs in 3 weeks if able to go to lab in light of pandemic (patient does not want to go out)\par \par \par fu in 10 weeks\par \par 7.21.2020\par one packet of testosterone gel  TESTIM\par was not covered by insurance\par 120 dollars per month\par energy levels; sexual function good when on \par reemphasized transmission risk\par protocol reviewed \par likes it better than AVEED \par will get labs in 3 weeks since patient ran out\par fu in 4 weeks\par \par 9.3.2020\par patient pleased with gel\par good libido and sense of well being\par excellent erection\par ? premature ejaculation\par labs reviewed\par discussed estradiol increase\par had been on Arimidex will restart\par labs in 3 weeks \par fu appt in 4 weeks\par \par 1.7.2021\par patient has run out of Testosterone and arimidex\par labs from 12 .2000 reviewed\par excellent T/E2 and H/H\par will restart\par \par discussed premature ejaculation\par PREMATURE EJACULATION\par The patient and I discussed the guidelines for the treatment of premature ejaculation. The patient reports that his symptoms cause him  distress. Discussed squeeze technique and sensate focus approach  Patient endorses that he attempts to "distance' himself and then ejaculates. \par Second line therapy commonly includes SSRI's.\par The NICK MONOGIOUDIS  expressed fully understanding of the information provided, the consequences and the management. .  He does not want to take additional medication and will attempt behavioural techniques\par \par Sexual dysfunction worse off oft T\par taking sildenafil\par continue current regimen\par \par Discussed PSA monitoring and controversy. \par Turning 51\par Patient wants to proceed,\par  \par \par labs in 3 months (h/h , PSA, T and Es\par followup in 3 months \par \par July 29, 2021\par Patient seen with telemedicine.\par \par He is maintained on testosterone 1% transdermal gel he has been doing well.  He felt that his premature ejaculation was better while on anastrozole.  He has not been on this. \par He does not feel premature ejaculation\par  He has not had bloods done.  He ran out of testosterone 1 week ago.  He feels that his sexual function has decreased.  His energy has decreased.  However he does feel well.\par \par He wishes to restart his testosterone supplementation.  We discussed the need for baseline blood studies.  We discussed his previous polycythemia after testosterone.\par \par We discussed PSA screening.  He wished to undergo PSA screening.\par \par Plan:\par 1.  Renewal of testosterone 50 mg / 5 g 1%\par 2.  Testosterone, PSA estradiol, hemoglobin hematocrit\par 3.  Office visit in 1 month with labs at that time\par \par 9.21.2021\par patient does not feel he is doing as well as he had previously\par has not been taking arimidex\par utilizing Testosterone 50mg  %g 1%\par labs reviewed \par patient reports PSA 0.49\par normal testosterone and estradiol\par patient feels erection and energy are not as good\par discussed adding Arimidex but would recommend against in light of prior rise in Hbg/Hct\par \par \par 1.4.22\par \par Father in hospital with COVID, pneumonia, sepsis\par Using sildenafil 60mg which has been less effective\par Pursuing bariatric surgery sleeve technique, discussed benefits for testosterone, estrogen, general well-being\par \par plan:\par 1. continue testosterone\par 2. repeat labs T/Hbg/Hct\par 3. Sildenafil 100mg \par 4. followup in one month\par \par \par

## 2022-01-27 ENCOUNTER — APPOINTMENT (OUTPATIENT)
Dept: ENDOCRINOLOGY | Facility: CLINIC | Age: 52
End: 2022-01-27
Payer: COMMERCIAL

## 2022-01-27 VITALS
DIASTOLIC BLOOD PRESSURE: 82 MMHG | HEART RATE: 89 BPM | WEIGHT: 278 LBS | RESPIRATION RATE: 16 BRPM | BODY MASS INDEX: 39.8 KG/M2 | OXYGEN SATURATION: 97 % | HEIGHT: 70 IN | SYSTOLIC BLOOD PRESSURE: 122 MMHG | TEMPERATURE: 98.2 F

## 2022-01-27 PROCEDURE — 99204 OFFICE O/P NEW MOD 45 MIN: CPT

## 2022-01-27 RX ORDER — PROMETHAZINE HYDROCHLORIDE AND CODEINE PHOSPHATE 6.25; 1 MG/5ML; MG/5ML
6.25-1 SOLUTION ORAL
Qty: 35 | Refills: 0 | Status: COMPLETED | COMMUNITY
Start: 2021-11-02 | End: 2022-01-27

## 2022-01-27 RX ORDER — AZITHROMYCIN 250 MG/1
250 TABLET, FILM COATED ORAL
Qty: 3 | Refills: 0 | Status: COMPLETED | COMMUNITY
Start: 2021-11-02 | End: 2022-01-27

## 2022-01-27 RX ORDER — ALBUTEROL SULFATE 90 UG/1
108 (90 BASE) INHALANT RESPIRATORY (INHALATION)
Qty: 3 | Refills: 1 | Status: COMPLETED | COMMUNITY
Start: 2021-11-05 | End: 2022-01-27

## 2022-01-27 RX ORDER — METHYLPREDNISOLONE 4 MG/1
4 TABLET ORAL
Qty: 1 | Refills: 0 | Status: COMPLETED | COMMUNITY
Start: 2021-11-02 | End: 2022-01-27

## 2022-01-27 RX ORDER — BECLOMETHASONE DIPROPIONATE HFA 80 UG/1
80 AEROSOL, METERED RESPIRATORY (INHALATION) TWICE DAILY
Qty: 1 | Refills: 0 | Status: COMPLETED | COMMUNITY
Start: 2021-11-05 | End: 2022-01-27

## 2022-04-07 LAB
ESTIMATED AVERAGE GLUCOSE: 123 MG/DL
HBA1C MFR BLD HPLC: 5.9 %

## 2022-04-08 ENCOUNTER — NON-APPOINTMENT (OUTPATIENT)
Age: 52
End: 2022-04-08

## 2022-04-08 LAB
ANION GAP SERPL CALC-SCNC: 13 MMOL/L
BUN SERPL-MCNC: 14 MG/DL
CALCIUM SERPL-MCNC: 9.7 MG/DL
CHLORIDE SERPL-SCNC: 102 MMOL/L
CHOLEST SERPL-MCNC: 182 MG/DL
CO2 SERPL-SCNC: 25 MMOL/L
CREAT SERPL-MCNC: 0.77 MG/DL
CREAT SPEC-SCNC: 164 MG/DL
EGFR: 109 ML/MIN/1.73M2
ESTRADIOL SERPL-MCNC: 79 PG/ML
GLUCOSE SERPL-MCNC: 105 MG/DL
HCT VFR BLD CALC: 52 %
HDLC SERPL-MCNC: 52 MG/DL
HGB BLD-MCNC: 17.1 G/DL
LDLC SERPL CALC-MCNC: 113 MG/DL
MICROALBUMIN 24H UR DL<=1MG/L-MCNC: <1.2 MG/DL
MICROALBUMIN/CREAT 24H UR-RTO: NORMAL MG/G
NONHDLC SERPL-MCNC: 130 MG/DL
POTASSIUM SERPL-SCNC: 4.1 MMOL/L
PSA SERPL-MCNC: 0.76 NG/ML
SODIUM SERPL-SCNC: 140 MMOL/L
TRIGL SERPL-MCNC: 88 MG/DL

## 2022-04-11 ENCOUNTER — TRANSCRIPTION ENCOUNTER (OUTPATIENT)
Age: 52
End: 2022-04-11

## 2022-04-12 LAB
TESTOST FREE SERPL-MCNC: 36.4 PG/ML
TESTOST SERPL-MCNC: 816 NG/DL

## 2022-04-14 ENCOUNTER — TRANSCRIPTION ENCOUNTER (OUTPATIENT)
Age: 52
End: 2022-04-14

## 2022-05-17 ENCOUNTER — APPOINTMENT (OUTPATIENT)
Dept: UROLOGY | Facility: CLINIC | Age: 52
End: 2022-05-17
Payer: COMMERCIAL

## 2022-05-17 VITALS
BODY MASS INDEX: 36.82 KG/M2 | SYSTOLIC BLOOD PRESSURE: 116 MMHG | TEMPERATURE: 97.9 F | HEIGHT: 71 IN | DIASTOLIC BLOOD PRESSURE: 79 MMHG | WEIGHT: 263 LBS | HEART RATE: 83 BPM

## 2022-05-17 DIAGNOSIS — E66.01 MORBID (SEVERE) OBESITY DUE TO EXCESS CALORIES: ICD-10-CM

## 2022-05-17 PROCEDURE — 99214 OFFICE O/P EST MOD 30 MIN: CPT

## 2022-05-17 NOTE — PHYSICAL EXAM
[Urethral Meatus] : meatus normal [Penis Abnormality] : normal circumcised penis [Epididymis] : the epididymides were normal [Testes Tenderness] : no tenderness of the testes [Testes Mass (___cm)] : there were no testicular masses [FreeTextEntry1] : atrophic testes

## 2022-05-17 NOTE — HISTORY OF PRESENT ILLNESS
[FreeTextEntry1] : Patient referred by Dr Ankur Abel for sexual dysfunction.\par Patient complaining of loss of libido x 3 months.\par No evaluation.\par Erections generally good.\par One one occasion lost erection during SI.\par  2.5 (second marriage) years\par 2 step children; and one 1.5 year old son.\par Son with neutropenia (viral)\par Couple very anxious\par \par 4.26.2017\par patient took only 40 mg of sildenafil ; 4/10 rigidity\par One hour later had partial tumescence\par Sufficient for SI; experienced premature ejaculation; had SI\par mild nasal congestion otherwise no side effects\par \par 5. 2017 started on testim; no side effects; "felt amazing" for the first several days and then regressed towards pretreatment. Symptoms better than prior to treatment.\par \par 7.19.2017\par Continues on testim one tube daily; had difficulty getting 2 tubes daily; insurance covered brand name but not generic. Energy improved, sexual function with Sildenafil 100 mg; erections good, no prolonged erection. Pleased\par \par 10.19.2017\par again stopped T replacement\par insurance would pay for brand but not generic.\par has been off for several days\par sexual function ok on sildenafil \par \par 7.25.2018\par returns with diminished libido\par seen by internist and repeated T\par T 183\par free T 39.1 \par \par \par 2.13.2019 \par patient is now maintained on AVEED (3rd injection today)\par \par 6.26.2019\par patient maintained on AVEED\par missed injection cycle\par now complaining of loss of libido and energy\par had surgery for deviated septum \par \par 8.14.2019\par patient on AVEED and Arimidex\par Improved energy and sexual functon\par \par 11.13.2019\par patient returns for AVEED and management\par he has stopped  ARIMIDEX (ran out of)\par feeling less energetic during this cycle\par \par 1.30.2020\par patient returns for AVEED\par again is off cycle\par patient embarked on weigh reduction and has last 15 lbs\par \par 4.15.2020 \par The patient-doctor relationship has been established in a face to face fashion via real time video/audio HIPAA compliant communication using telemedicine software.  The patient's identity has been confirmed.  The patient was previously emailed a copy of the telemedicine consent.  They have had a chance to review and has now given verbal consent and has requested care to be assessed and treated through telemedicine and understands there maybe limitations in this process and they may need further follow up care in the office and or hospital settings.   \par THE FOLLOWING WAS READ TO AND CONSENTED TO BY PATIENT\par By virtue of my participation in this telehealth visit, I am consenting to receive care through telehealth. Telehealth is the use of electronic information and communication technologies by providers to deliver health care to patients at a distance. \par I understand that any care provided to me through [employer]’s telehealth application (“the [employer] kesha”) will incorporate security protocols to protect the privacy and security of my health information. If any other application is used to provide care to me, I understand that the technology may not contain appropriate security protocols to protect the privacy and security of my health information. My provider has explained to me the risks associated with the technology platforms that he or she is using to provide care to me. I acknowledge that there are potential risks associated with any technology used while obtaining care through telehealth, including, but not limited to, connectively interruptions, other technical difficulties, and unauthorized access by a third party to one’s health information. Despite these risks, I agree to participate in the telehealth encounter. \par I understand and agree that I or my healthcare provider may terminate a telehealth encounter at any time in the event of a technical malfunction. \par I also understand that my location determines where medicine is being practiced. As a result, I will inform my provider where I am located at the time of my telehealth visit. \par I understand that there may be costs associated with a telehealth visit. I agree that I am responsible for any fees associated with the telehealth services that I receive. \par This Informed Consent for Telehealth Services During a Public Health Emergency will remain in effect solely during the term of the public health emergency.\par \par Verbal consent given on Apr 15, 2020  10:30AM by Bran Mar from Mr. KRYSTIAN SANCHES \par  \par \par Last aveed January 30th\par patient is behind schedule\par anxiety related to COVID19\par sexual activity maintained\par \par 7.21.2020\par The patient-doctor relationship has been established in a face to face fashion via real time video/audio HIPAA compliant communication using telemedicine software.  The patient's identity has been confirmed.  The patient was previously emailed a copy of the telemedicine consent.  They have had a chance to review and has now given verbal consent and has requested care to be assessed and treated through telemedicine and understands there maybe limitations in this process and they may need further follow up care in the office and or hospital settings.   \par THE FOLLOWING WAS READ TO AND CONSENTED TO BY PATIENT\par By virtue of my participation in this telehealth visit, I am consenting to receive care through telehealth. Telehealth is the use of electronic information and communication technologies by providers to deliver health care to patients at a distance. \par I understand that any care provided to me through [employer]’s telehealth application (“the [employer]ha”) will incorporate security protocols to protect the privacy and security of my health information. If any other application is used to provide care to me, I understand that the technology may not contain appropriate security protocols to protect the privacy and security of my health information. My provider has explained to me the risks associated with the technology platforms that he or she is using to provide care to me. I acknowledge that there are potential risks associated with any technology used while obtaining care through telehealth, including, but not limited to, connectively interruptions, other technical difficulties, and unauthorized access by a third party to one’s health information. Despite these risks, I agree to participate in the telehealth encounter. \par I understand and agree that I or my healthcare provider may terminate a telehealth encounter at any time in the event of a technical malfunction. \par I also understand that my location determines where medicine is being practiced. As a result, I will inform my provider where I am located at the time of my telehealth visit. \par I understand that there may be costs associated with a telehealth visit. I agree that I am responsible for any fees associated with the telehealth services that I receive. \par This Informed Consent for Telehealth Services During a Public Health Emergency will remain in effect solely during the term of the public health emergency.\par \par Verbal consent given on Jul 21, 2020  10:00AM by Bran Mar from Mr. KRYSTIAN STAPLETONOUBARRY \par  \par \par \par 9.3.2020\par The patient-doctor relationship has been established in a face to face fashion via real time video/audio HIPAA compliant communication using telemedicine software.  The patient's identity has been confirmed.  The patient was previously emailed a copy of the telemedicine consent.  They have had a chance to review and has now given verbal consent and has requested care to be assessed and treated through telemedicine and understands there maybe limitations in this process and they may need further follow up care in the office and or hospital settings.   \par THE FOLLOWING WAS READ TO AND CONSENTED TO BY PATIENT\par By virtue of my participation in this telehealth visit, I am consenting to receive care through telehealth. Telehealth is the use of electronic information and communication technologies by providers to deliver health care to patients at a distance. \par I understand that any care provided to me through [employer]’s telehealth application (“the [employer] k[employer]ha”) will incorporate security protocols to protect the privacy and security of my health information. If any other application is used to provide care to me, I understand that the technology may not contain appropriate security protocols to protect the privacy and security of my health information. My provider has explained to me the risks associated with the technology platforms that he or she is using to provide care to me. I acknowledge that there are potential risks associated with any technology used while obtaining care through telehealth, including, but not limited to, connectively interruptions, other technical difficulties, and unauthorized access by a third party to one’s health information. Despite these risks, I agree to participate in the telehealth encounter. \par I understand and agree that I or my healthcare provider may terminate a telehealth encounter at any time in the event of a technical malfunction. \par I also understand that my location determines where medicine is being practiced. As a result, I will inform my provider where I am located at the time of my telehealth visit. \par I understand that there may be costs associated with a telehealth visit. I agree that I am responsible for any fees associated with the telehealth services that I receive. \par This Informed Consent for Telehealth Services During a Public Health Emergency will remain in effect solely during the term of the public health emergency.\par \par Verbal consent given on Sep 03, 2020   3:30PM by Bran Mar from Mr. KRYSTIAN GALVANGIOUDIS \par  \par feeling good; energetic\par excellent erection\par premature ejaculation\par \par \par 1.7.2021\par returns has been on arimidex and one tube testim\par patient felt that he had not been doing as welll\par was feeling less energetic \par less interested in sexual function \par complaining premature ejaculation\par \par 7.29.2021\par The patient-doctor relationship has been established in a face to face fashion via real time video/audio HIPAA compliant communication using telemedicine software.  The patient's identity has been confirmed.  The patient was previously emailed a copy of the telemedicine consent.  They have had a chance to review and has now given verbal consent and has requested care to be assessed and treated through telemedicine and understands there maybe limitations in this process and they may need further follow up care in the office and or hospital settings.   \par THE FOLLOWING WAS READ TO AND CONSENTED TO BY PATIENT\par By virtue of my participation in this telehealth visit, I am consenting to receive care through telehealth. Telehealth is the use of electronic information and communication technologies by providers to deliver health care to patients at a distance. \par I understand that any care provided to me through [employer]’s telehealth application (“the [employer] k[employer]ha”) will incorporate security protocols to protect the privacy and security of my health information. If any other application is used to provide care to me, I understand that the technology may not contain appropriate security protocols to protect the privacy and security of my health information. My provider has explained to me the risks associated with the technology platforms that he or she is using to provide care to me. I acknowledge that there are potential risks associated with any technology used while obtaining care through telehealth, including, but not limited to, connectively interruptions, other technical difficulties, and unauthorized access by a third party to one’s health information. Despite these risks, I agree to participate in the telehealth encounter. \par I understand and agree that I or my healthcare provider may terminate a telehealth encounter at any time in the event of a technical malfunction. \par I also understand that my location determines where medicine is being practiced. As a result, I will inform my provider where I am located at the time of my telehealth visit. \par I understand that there may be costs associated with a telehealth visit. I agree that I am responsible for any fees associated with the telehealth services that I receive. \par This Informed Consent for Telehealth Services During a Public Health Emergency will remain in effect solely during the term of the public health emergency.\par \par Verbal consent given on Jul 29, 2021   3:00PM by Bran Mar from Mr. KRYSTIAN SANCHES \par \par 9.21.2021\par The patient-doctor relationship has been established in a face to face fashion via real time video/audio HIPAA compliant communication using telemedicine software.  The patient's identity has been confirmed.  The patient was previously emailed a copy of the telemedicine consent.  They have had a chance to review and has now given verbal consent and has requested care to be assessed and treated through telemedicine and understands there maybe limitations in this process and they may need further follow up care in the office and or hospital settings.   \par THE FOLLOWING WAS READ TO AND CONSENTED TO BY PATIENT\par By virtue of my participation in this telehealth visit, I am consenting to receive care through telehealth. Telehealth is the use of electronic information and communication technologies by providers to deliver health care to patients at a distance. \par I understand that any care provided to me through [employer]’s telehealth application (“the [employer] k[employer]ha”) will incorporate security protocols to protect the privacy and security of my health information. If any other application is used to provide care to me, I understand that the technology may not contain appropriate security protocols to protect the privacy and security of my health information. My provider has explained to me the risks associated with the technology platforms that he or she is using to provide care to me. I acknowledge that there are potential risks associated with any technology used while obtaining care through telehealth, including, but not limited to, connectively interruptions, other technical difficulties, and unauthorized access by a third party to one’s health information. Despite these risks, I agree to participate in the telehealth encounter. \par I understand and agree that I or my healthcare provider may terminate a telehealth encounter at any time in the event of a technical malfunction. \par I also understand that my location determines where medicine is being practiced. As a result, I will inform my provider where I am located at the time of my telehealth visit. \par I understand that there may be costs associated with a telehealth visit. I agree that I am responsible for any fees associated with the telehealth services that I receive. \par This Informed Consent for Telehealth Services During a Public Health Emergency will remain in effect solely during the term of the public health emergency.\par \par Verbal consent given on Sep 21, 2021   2:30PM by Bran Mar from Mr. KRYSTIAN SANCHES \par  MULTIPLE ATTEMPTS AMWELL UNSUCCESSFUL\par [employer] with patient consent and awareness\par \par returns re management of hypogonadism\par \par \par \par \par  9.21.2021\par patient does not feel he is doing as well as he had previously\par has not been taking arimidex\par utilizing Testosterone 50mg %g 1%\par labs reviewed \par patient reports PSA 0.49\par normal testosterone and estradiol\par patient feels erection and energy are not as good\par discussed adding Arimidex but would recommend against in light of prior rise in Hbg/Hct\par \par \par 1.4.22\par \par Father in hospital with COVID, pneumonia, sepsis\par Using sildenafil 60mg which has been less effective\par Pursuing bariatric surgery sleeve technique, discussed benefits for testosterone, estrogen, general well-being\par \par plan:\par 1. continue testosterone\par 2. repeat labs T/Hbg/Hct\par 3. Sildenafil 100mg \par 4. followup in one month\par \par \par . \par 5.17.2022\par patient returns has stopped T secondary to elevated Hbg/Hct\par patient states that he wsa not sleeping well\par no energy\par did not feel well\par has not pursued bariatric surgery losing weight on his own; down to 263 \par discussed the cliniclal need for T replacement and the approach of quarterly therapeutic phlebotomy\par \par P\par \par \par \par \par

## 2022-05-17 NOTE — ASSESSMENT
[FreeTextEntry1] : Patient had been on maintenance aveed\par Unable to continue because of pandemic\par Will restart on Testim\par \par We had an extensive discussion re Risks of T replacement\par We discussed risk to T exposure to partners and children from transdermal appoaches.\par Will restart \par Labs in 3 weeks if able to go to lab in light of pandemic (patient does not want to go out)\par \par \par fu in 10 weeks\par \par 7.21.2020\par one packet of testosterone gel  TESTIM\par was not covered by insurance\par 120 dollars per month\par energy levels; sexual function good when on \par reemphasized transmission risk\par protocol reviewed \par likes it better than AVEED \par will get labs in 3 weeks since patient ran out\par fu in 4 weeks\par \par 9.3.2020\par patient pleased with gel\par good libido and sense of well being\par excellent erection\par ? premature ejaculation\par labs reviewed\par discussed estradiol increase\par had been on Arimidex will restart\par labs in 3 weeks \par fu appt in 4 weeks\par \par 1.7.2021\par patient has run out of Testosterone and arimidex\par labs from 12 .2000 reviewed\par excellent T/E2 and H/H\par will restart\par \par discussed premature ejaculation\par PREMATURE EJACULATION\par The patient and I discussed the guidelines for the treatment of premature ejaculation. The patient reports that his symptoms cause him  distress. Discussed squeeze technique and sensate focus approach  Patient endorses that he attempts to "distance' himself and then ejaculates. \par Second line therapy commonly includes SSRI's.\par The NICK MONOGIOUDIS  expressed fully understanding of the information provided, the consequences and the management. .  He does not want to take additional medication and will attempt behavioural techniques\par \par Sexual dysfunction worse off oft T\par taking sildenafil\par continue current regimen\par \par Discussed PSA monitoring and controversy. \par Turning 51\par Patient wants to proceed,\par  \par \par labs in 3 months (h/h , PSA, T and Es\par followup in 3 months \par \par July 29, 2021\par Patient seen with telemedicine.\par \par He is maintained on testosterone 1% transdermal gel he has been doing well.  He felt that his premature ejaculation was better while on anastrozole.  He has not been on this. \par He does not feel premature ejaculation\par  He has not had bloods done.  He ran out of testosterone 1 week ago.  He feels that his sexual function has decreased.  His energy has decreased.  However he does feel well.\par \par He wishes to restart his testosterone supplementation.  We discussed the need for baseline blood studies.  We discussed his previous polycythemia after testosterone.\par \par We discussed PSA screening.  He wished to undergo PSA screening.\par \par Plan:\par 1.  Renewal of testosterone 50 mg / 5 g 1%\par 2.  Testosterone, PSA estradiol, hemoglobin hematocrit\par 3.  Office visit in 1 month with labs at that time\par \par 9.21.2021\par patient does not feel he is doing as well as he had previously\par has not been taking arimidex\par utilizing Testosterone 50mg  %g 1%\par labs reviewed \par patient reports PSA 0.49\par normal testosterone and estradiol\par patient feels erection and energy are not as good\par discussed adding Arimidex but would recommend against in light of prior rise in Hbg/Hct\par \par \par 1.4.22\par \par Father in hospital with COVID, pneumonia, sepsis\par Using sildenafil 60mg which has been less effective\par Pursuing bariatric surgery sleeve technique, discussed benefits for testosterone, estrogen, general well-being\par \par plan:\par 1. continue testosterone\par 2. repeat labs T/Hbg/Hct\par 3. Sildenafil 100mg \par 4. followup in one month\par \par 5.17.2022\par Patient returns regarding hypogonadism, sexual dysfunction and loss of energy.  He states that he stopped taking transdermal testosterone as instructed after he was found to have colon 1.  Hyperestrogenism, 2.  Elevated hemoglobin and hematocrit.  Since that time he is occasionally placed gel in light of loss of libido and energy and sexual function.  He states he still responds to the phosphodiesterase inhibitor; however, he states he has loss of libido and energy.  He is not pleased with his symptoms.  He wishes to restart testosterone.  We discussed the risk of restarting testosterone we reviewed his previous testosterone levels.  He has not had significant elevation of his testosterone; his values are within the desired range.  He has has had marked elevation of his estrogen and was placed on anastrozole.  He states that while on anastrozole he felt markedly better with resolution of premature ejaculation etc.  In told him that I did not think that this was likely to be the effects of anastrozole.  Anastrozole would only be appropriate if he was found to have elevated estrogen.  We discussed the fact that he might require periodic therapeutic phlebotomy if we restart his testosterone.  He expressed full understanding and willingness to do this.\par \par Also has noticed the fact that he is lost about 30 pounds since last seen.  We discussed the potential impact of this on his testosterone and estrogen metabolism.  Therefore we plan to proceed with new studies of his current testosterone, hemoglobin, hematocrit, estradiol etc. depending on the results of this he will undergo treatment with:\par 1.  Transdermal testosterone\par 2.  Transdermal testosterone and anastrozole or 3, anastrozole\par 4 .  Continue sildenafil 50 mg\par The NICK MONOGIOUDIS  expressed fully understanding of the information provided, the consequences and the management.\par \par \par

## 2022-05-18 LAB
ALBUMIN SERPL ELPH-MCNC: 4.8 G/DL
ALP BLD-CCNC: 97 U/L
ALT SERPL-CCNC: 19 U/L
ANION GAP SERPL CALC-SCNC: 12 MMOL/L
AST SERPL-CCNC: 37 U/L
BASOPHILS # BLD AUTO: 0.04 K/UL
BASOPHILS NFR BLD AUTO: 0.5 %
BILIRUB SERPL-MCNC: 0.2 MG/DL
BUN SERPL-MCNC: 17 MG/DL
CALCIUM SERPL-MCNC: 9.8 MG/DL
CHLORIDE SERPL-SCNC: 101 MMOL/L
CO2 SERPL-SCNC: 26 MMOL/L
CREAT SERPL-MCNC: 0.96 MG/DL
EGFR: 96 ML/MIN/1.73M2
EOSINOPHIL # BLD AUTO: 0.11 K/UL
EOSINOPHIL NFR BLD AUTO: 1.4 %
ESTRADIOL SERPL-MCNC: 28 PG/ML
GLUCOSE SERPL-MCNC: 106 MG/DL
HCT VFR BLD CALC: 48.9 %
HGB BLD-MCNC: 16.2 G/DL
IMM GRANULOCYTES NFR BLD AUTO: 0.3 %
LYMPHOCYTES # BLD AUTO: 1.99 K/UL
LYMPHOCYTES NFR BLD AUTO: 25.4 %
MAN DIFF?: NORMAL
MCHC RBC-ENTMCNC: 29.6 PG
MCHC RBC-ENTMCNC: 33.1 GM/DL
MCV RBC AUTO: 89.2 FL
MONOCYTES # BLD AUTO: 0.46 K/UL
MONOCYTES NFR BLD AUTO: 5.9 %
NEUTROPHILS # BLD AUTO: 5.23 K/UL
NEUTROPHILS NFR BLD AUTO: 66.5 %
PLATELET # BLD AUTO: 276 K/UL
POTASSIUM SERPL-SCNC: 4.6 MMOL/L
PROT SERPL-MCNC: 7.4 G/DL
RBC # BLD: 5.48 M/UL
RBC # FLD: 14.4 %
SODIUM SERPL-SCNC: 140 MMOL/L
TESTOST SERPL-MCNC: 89.2 NG/DL
WBC # FLD AUTO: 7.85 K/UL

## 2022-05-19 RX ORDER — TESTOSTERONE 50 MG/5G
50 MG/5GM GEL TRANSDERMAL
Qty: 3 | Refills: 0 | Status: DISCONTINUED | COMMUNITY
Start: 2020-04-15 | End: 2022-05-19

## 2022-05-20 LAB — SHBG SERPL-SCNC: 13.8 NMOL/L

## 2022-06-23 ENCOUNTER — APPOINTMENT (OUTPATIENT)
Dept: GASTROENTEROLOGY | Facility: CLINIC | Age: 52
End: 2022-06-23
Payer: COMMERCIAL

## 2022-06-23 VITALS
SYSTOLIC BLOOD PRESSURE: 128 MMHG | DIASTOLIC BLOOD PRESSURE: 79 MMHG | WEIGHT: 263 LBS | BODY MASS INDEX: 36.82 KG/M2 | HEIGHT: 71 IN | HEART RATE: 76 BPM

## 2022-06-23 DIAGNOSIS — Z12.11 ENCOUNTER FOR SCREENING FOR MALIGNANT NEOPLASM OF COLON: ICD-10-CM

## 2022-06-23 PROCEDURE — 99203 OFFICE O/P NEW LOW 30 MIN: CPT

## 2022-06-23 NOTE — HISTORY OF PRESENT ILLNESS
[de-identified] : Mr. KRYSTIAN SANCHES is a 52 year old male presents for screening colonoscopy. Patient has no complaints of bowel issues, bleeding, abdominal pain, family history of colon cancer, GERD symptoms. Patient had last colonoscopy over fifteen years ago. \par \par

## 2022-07-12 NOTE — DATA REVIEWED
[No studies available for review at this time.] : No studies available for review at this time.
Adult

## 2022-08-16 ENCOUNTER — APPOINTMENT (OUTPATIENT)
Dept: INTERNAL MEDICINE | Facility: CLINIC | Age: 52
End: 2022-08-16

## 2022-08-16 VITALS
HEART RATE: 84 BPM | DIASTOLIC BLOOD PRESSURE: 70 MMHG | BODY MASS INDEX: 39.06 KG/M2 | TEMPERATURE: 98.6 F | OXYGEN SATURATION: 97 % | SYSTOLIC BLOOD PRESSURE: 108 MMHG | WEIGHT: 279 LBS | HEIGHT: 71 IN | RESPIRATION RATE: 16 BRPM

## 2022-08-16 DIAGNOSIS — R73.9 HYPERGLYCEMIA, UNSPECIFIED: ICD-10-CM

## 2022-08-16 DIAGNOSIS — R73.03 PREDIABETES.: ICD-10-CM

## 2022-08-16 DIAGNOSIS — Z00.00 ENCOUNTER FOR GENERAL ADULT MEDICAL EXAMINATION W/OUT ABNORMAL FINDINGS: ICD-10-CM

## 2022-08-16 DIAGNOSIS — G47.30 SLEEP APNEA, UNSPECIFIED: ICD-10-CM

## 2022-08-16 DIAGNOSIS — E66.01 MORBID (SEVERE) OBESITY DUE TO EXCESS CALORIES: ICD-10-CM

## 2022-08-16 PROCEDURE — 99396 PREV VISIT EST AGE 40-64: CPT

## 2022-08-16 NOTE — HEALTH RISK ASSESSMENT
[Never] : Never [Yes] : Yes [2 - 4 times a month (2 pts)] : 2-4 times a month (2 points) [1 or 2 (0 pts)] : 1 or 2 (0 points) [Never (0 pts)] : Never (0 points) [No] : In the past 12 months have you used drugs other than those required for medical reasons? No [0] : 2) Feeling down, depressed, or hopeless: Not at all (0)

## 2022-08-16 NOTE — HISTORY OF PRESENT ILLNESS
[FreeTextEntry1] : physical examination [de-identified] : Patient presents for an annual physical examination. He is a 52-year-old morbidly obese male with a history of low testosterone/hypogonadism, obstructive sleep apnea and insomnia. Patient is still receiving testosterone supplementation as per his urologist. He is followed by his sleep DrEric Biggs, who is prescribing him Ambien CR and clonazepam. Patient has a history of obstructive sleep apnea but is not on CPAP. He has had multiple interfaces including nasal pillows, nasal mask and full face mask. He states that his last home polysomnography examination showed mild obstructive sleep apnea. He is also try an oral dental appliance without success.

## 2022-08-16 NOTE — PLAN
[FreeTextEntry1] : Mr. Lange presents for an annual physical examination.\par \par Number one. He will continue on current medications which been reviewed and revised.\par \par #2. Patient be given a prescription for CBC, CMP with hemoglobin A1c, 11, lipid profile, PSA level, free and total testosterone, thyroid stimulating hormone, urinalysis and vitamin D level.\par \par #3. Patient will continue on testosterone supplementation as per his urologist.\par \par #4. Patient does not wish to have another polysomnography examination.\par \par #5. I have again discussed with patient the possibility of bariatric surgery for weight loss. He states he will look into it.\par \par #6. Followup in one year.

## 2022-08-18 LAB
25(OH)D3 SERPL-MCNC: 28.4 NG/ML
ALBUMIN SERPL ELPH-MCNC: 4.6 G/DL
ALP BLD-CCNC: 85 U/L
ALT SERPL-CCNC: 23 U/L
ANION GAP SERPL CALC-SCNC: 14 MMOL/L
APPEARANCE: CLEAR
AST SERPL-CCNC: 32 U/L
BACTERIA: NEGATIVE
BASOPHILS # BLD AUTO: 0.04 K/UL
BASOPHILS NFR BLD AUTO: 0.7 %
BILIRUB SERPL-MCNC: 0.3 MG/DL
BILIRUBIN URINE: NEGATIVE
BLOOD URINE: NEGATIVE
BUN SERPL-MCNC: 13 MG/DL
CALCIUM SERPL-MCNC: 9.4 MG/DL
CHLORIDE SERPL-SCNC: 102 MMOL/L
CHOLEST SERPL-MCNC: 177 MG/DL
CO2 SERPL-SCNC: 25 MMOL/L
COLOR: YELLOW
CREAT SERPL-MCNC: 0.85 MG/DL
EGFR: 105 ML/MIN/1.73M2
EOSINOPHIL # BLD AUTO: 0.27 K/UL
EOSINOPHIL NFR BLD AUTO: 4.4 %
ESTIMATED AVERAGE GLUCOSE: 120 MG/DL
GLUCOSE QUALITATIVE U: NEGATIVE
GLUCOSE SERPL-MCNC: 117 MG/DL
HBA1C MFR BLD HPLC: 5.8 %
HCT VFR BLD CALC: 43.9 %
HDLC SERPL-MCNC: 64 MG/DL
HGB BLD-MCNC: 15.2 G/DL
HYALINE CASTS: 0 /LPF
IMM GRANULOCYTES NFR BLD AUTO: 0.3 %
IRON SERPL-MCNC: 96 UG/DL
KETONES URINE: NEGATIVE
LDLC SERPL CALC-MCNC: 99 MG/DL
LEUKOCYTE ESTERASE URINE: NEGATIVE
LYMPHOCYTES # BLD AUTO: 2.03 K/UL
LYMPHOCYTES NFR BLD AUTO: 33.3 %
MAN DIFF?: NORMAL
MCHC RBC-ENTMCNC: 31.4 PG
MCHC RBC-ENTMCNC: 34.6 GM/DL
MCV RBC AUTO: 90.7 FL
MICROSCOPIC-UA: NORMAL
MONOCYTES # BLD AUTO: 0.42 K/UL
MONOCYTES NFR BLD AUTO: 6.9 %
NEUTROPHILS # BLD AUTO: 3.32 K/UL
NEUTROPHILS NFR BLD AUTO: 54.4 %
NITRITE URINE: NEGATIVE
NONHDLC SERPL-MCNC: 114 MG/DL
PH URINE: 6
PLATELET # BLD AUTO: 233 K/UL
POTASSIUM SERPL-SCNC: 4.5 MMOL/L
PROT SERPL-MCNC: 7 G/DL
PROTEIN URINE: NEGATIVE
PSA SERPL-MCNC: 0.78 NG/ML
RBC # BLD: 4.84 M/UL
RBC # FLD: 13.7 %
RED BLOOD CELLS URINE: 2 /HPF
SODIUM SERPL-SCNC: 141 MMOL/L
SPECIFIC GRAVITY URINE: 1.02
SQUAMOUS EPITHELIAL CELLS: 0 /HPF
TRIGL SERPL-MCNC: 73 MG/DL
TSH SERPL-ACNC: 2.36 UIU/ML
UROBILINOGEN URINE: NORMAL
WBC # FLD AUTO: 6.1 K/UL
WHITE BLOOD CELLS URINE: 1 /HPF

## 2022-08-19 LAB
BASOPHILS # BLD AUTO: 0.04 K/UL
BASOPHILS NFR BLD AUTO: 0.7 %
EOSINOPHIL # BLD AUTO: 0.26 K/UL
EOSINOPHIL NFR BLD AUTO: 4.3 %
HCT VFR BLD CALC: 44.6 %
HGB BLD-MCNC: 15.2 G/DL
IMM GRANULOCYTES NFR BLD AUTO: 0.2 %
LYMPHOCYTES # BLD AUTO: 1.96 K/UL
LYMPHOCYTES NFR BLD AUTO: 32.3 %
MAN DIFF?: NORMAL
MCHC RBC-ENTMCNC: 30.8 PG
MCHC RBC-ENTMCNC: 34.1 GM/DL
MCV RBC AUTO: 90.5 FL
MONOCYTES # BLD AUTO: 0.43 K/UL
MONOCYTES NFR BLD AUTO: 7.1 %
NEUTROPHILS # BLD AUTO: 3.37 K/UL
NEUTROPHILS NFR BLD AUTO: 55.4 %
PLATELET # BLD AUTO: 238 K/UL
RBC # BLD: 4.93 M/UL
RBC # FLD: 13.6 %
WBC # FLD AUTO: 6.07 K/UL

## 2022-08-22 ENCOUNTER — NON-APPOINTMENT (OUTPATIENT)
Age: 52
End: 2022-08-22

## 2022-08-30 ENCOUNTER — RESULT REVIEW (OUTPATIENT)
Age: 52
End: 2022-08-30

## 2022-08-30 ENCOUNTER — APPOINTMENT (OUTPATIENT)
Dept: GASTROENTEROLOGY | Facility: AMBULATORY MEDICAL SERVICES | Age: 52
End: 2022-08-30

## 2022-08-30 PROCEDURE — 45380 COLONOSCOPY AND BIOPSY: CPT | Mod: 33

## 2022-09-01 LAB — TESTOST SERPL-MCNC: 962 NG/DL

## 2022-09-13 LAB
C DIFF TOX GENS STL QL NAA+PROBE: NORMAL
CDIFF BY PCR: NOT DETECTED
GI PCR PANEL: NOT DETECTED

## 2022-12-16 NOTE — PLAN
Forms completed, signed, left at completed bin for MA staff to let patient know for      [FreeTextEntry1] : Weight loss surgery.  The risks, benefits and alternatives, including laparoscopic gastric bypass, and laparoscopic vertical sleeve gastrectomy, were discussed at length and all of his questions were answered.  The patient appears to understand and wishes to proceed.\par \par The patient was given the following instructions:\par \par 1.	He needs a complete medical evaluation including echocardiogram, stress test, pulmonary function test and evaluation for sleep apnea.\par \par 2.	He needs an upper endoscopy.\par \par 3.	He needs dietary and psychological evaluations.\par \par 4.	He must attend a preoperative support group meeting.\par \par 5.	He must undergo a right upper quadrant ultrasound.\par \par The patient clearly understood that surgery would only be scheduled if there are no medical or psychiatric contraindications and a second office visit is required.  \par

## 2022-12-17 ENCOUNTER — NON-APPOINTMENT (OUTPATIENT)
Age: 52
End: 2022-12-17

## 2023-01-30 ENCOUNTER — APPOINTMENT (OUTPATIENT)
Dept: ENDOCRINOLOGY | Facility: CLINIC | Age: 53
End: 2023-01-30

## 2023-02-02 ENCOUNTER — NON-APPOINTMENT (OUTPATIENT)
Age: 53
End: 2023-02-02

## 2023-02-21 ENCOUNTER — APPOINTMENT (OUTPATIENT)
Dept: UROLOGY | Facility: CLINIC | Age: 53
End: 2023-02-21
Payer: SELF-PAY

## 2023-02-21 VITALS
SYSTOLIC BLOOD PRESSURE: 128 MMHG | TEMPERATURE: 97.8 F | HEART RATE: 69 BPM | DIASTOLIC BLOOD PRESSURE: 84 MMHG | OXYGEN SATURATION: 98 %

## 2023-02-21 PROCEDURE — 99214 OFFICE O/P EST MOD 30 MIN: CPT

## 2023-02-21 NOTE — ASSESSMENT
[FreeTextEntry1] : Patient had been on maintenance aveed\par Unable to continue because of pandemic\par Will restart on Testim\par \par We had an extensive discussion re Risks of T replacement\par We discussed risk to T exposure to partners and children from transdermal appoaches.\par Will restart \par Labs in 3 weeks if able to go to lab in light of pandemic (patient does not want to go out)\par \par \par fu in 10 weeks\par \par 7.21.2020\par one packet of testosterone gel  TESTIM\par was not covered by insurance\par 120 dollars per month\par energy levels; sexual function good when on \par reemphasized transmission risk\par protocol reviewed \par likes it better than AVEED \par will get labs in 3 weeks since patient ran out\par fu in 4 weeks\par \par 9.3.2020\par patient pleased with gel\par good libido and sense of well being\par excellent erection\par ? premature ejaculation\par labs reviewed\par discussed estradiol increase\par had been on Arimidex will restart\par labs in 3 weeks \par fu appt in 4 weeks\par \par 1.7.2021\par patient has run out of Testosterone and arimidex\par labs from 12 .2000 reviewed\par excellent T/E2 and H/H\par will restart\par \par discussed premature ejaculation\par PREMATURE EJACULATION\par The patient and I discussed the guidelines for the treatment of premature ejaculation. The patient reports that his symptoms cause him  distress. Discussed squeeze technique and sensate focus approach  Patient endorses that he attempts to "distance' himself and then ejaculates. \par Second line therapy commonly includes SSRI's.\par The [facility]  expressed fully understanding of the information provided, the consequences and the management. .  He does not want to take additional medication and will attempt behavioural techniques\par \par Sexual dysfunction worse off oft T\par taking sildenafil\par continue current regimen\par \par Discussed PSA monitoring and controversy. \par Turning 51\par Patient wants to proceed,\par  \par \par labs in 3 months (h/h , PSA, T and Es\par followup in 3 months \par \par July 29, 2021\par Patient seen with telemedicine.\par \par He is maintained on testosterone 1% transdermal gel he has been doing well.  He felt that his premature ejaculation was better while on anastrozole.  He has not been on this. \par He does not feel premature ejaculation\par  He has not had bloods done.  He ran out of testosterone 1 week ago.  He feels that his sexual function has decreased.  His energy has decreased.  However he does feel well.\par \par He wishes to restart his testosterone supplementation.  We discussed the need for baseline blood studies.  We discussed his previous polycythemia after testosterone.\par \par We discussed PSA screening.  He wished to undergo PSA screening.\par \par Plan:\par 1.  Renewal of testosterone 50 mg / 5 g 1%\par 2.  Testosterone, PSA estradiol, hemoglobin hematocrit\par 3.  Office visit in 1 month with labs at that time\par \par 9.21.2021\par patient does not feel he is doing as well as he had previously\par has not been taking arimidex\par utilizing Testosterone 50mg  %g 1%\par labs reviewed \par patient reports PSA 0.49\par normal testosterone and estradiol\par patient feels erection and energy are not as good\par discussed adding Arimidex but would recommend against in light of prior rise in Hbg/Hct\par \par \par 1.4.22\par \par Father in hospital with COVID, pneumonia, sepsis\par Using sildenafil 60mg which has been less effective\par Pursuing bariatric surgery sleeve technique, discussed benefits for testosterone, estrogen, general well-being\par \par plan:\par 1. continue testosterone\par 2. repeat labs T/Hbg/Hct\par 3. Sildenafil 100mg \par 4. followup in one month\par \par 5.17.2022\par Patient returns regarding hypogonadism, sexual dysfunction and loss of energy.  He states that he stopped taking transdermal testosterone as instructed after he was found to have colon 1.  Hyperestrogenism, 2.  Elevated hemoglobin and hematocrit.  Since that time he is occasionally placed gel in light of loss of libido and energy and sexual function.  He states he still responds to the phosphodiesterase inhibitor; however, he states he has loss of libido and energy.  He is not pleased with his symptoms.  He wishes to restart testosterone.  We discussed the risk of restarting testosterone we reviewed his previous testosterone levels.  He has not had significant elevation of his testosterone; his values are within the desired range.  He has has had marked elevation of his estrogen and was placed on anastrozole.  He states that while on anastrozole he felt markedly better with resolution of premature ejaculation etc.  In told him that I did not think that this was likely to be the effects of anastrozole.  Anastrozole would only be appropriate if he was found to have elevated estrogen.  We discussed the fact that he might require periodic therapeutic phlebotomy if we restart his testosterone.  He expressed full understanding and willingness to do this.\par \par Also has noticed the fact that he is lost about 30 pounds since last seen.  We discussed the potential impact of this on his testosterone and estrogen metabolism.  Therefore we plan to proceed with new studies of his current testosterone, hemoglobin, hematocrit, estradiol etc. depending on the results of this he will undergo treatment with:\par 1.  Transdermal testosterone\par 2.  Transdermal testosterone and anastrozole or 3, anastrozole\par 4 .  Continue sildenafil 50 mg\par The [facility]  expressed fully understanding of the information provided, the consequences and the management.\par \par 2.21.2023\par returns on transdermal T 1.62%\par increased dose to 5 pumps over 1 month (did on his own)  Now feels better with \par increased energy and libido\par sexually active\par gained weight, up to 270 lbs\par did not proceed with bariatric surgery\par issues with insurance, on appeal\par normal voiding pattern\par \par IPSS 5\par ASAD 18\par \par discussed unremarkable exam\par discussed possibility of polycythemia, side effect form T replacement\par discussed significance of repeating labs\par discussed adjusting Androgel dose if necessary\par discussed importance of losing weight\par \par plan:\par 1. T, H&H, estradiol, CMP\par 2. continue T dose\par 3. f/u in 3 months\par \par \par

## 2023-02-21 NOTE — HISTORY OF PRESENT ILLNESS
[FreeTextEntry1] : Patient referred by Dr Ankur Abel for sexual dysfunction.\par Patient complaining of loss of libido x 3 months.\par No evaluation.\par Erections generally good.\par One one occasion lost erection during SI.\par  2.5 (second marriage) years\par 2 step children; and one 1.5 year old son.\par Son with neutropenia (viral)\par Couple very anxious\par \par 4.26.2017\par patient took only 40 mg of sildenafil ; 4/10 rigidity\par One hour later had partial tumescence\par Sufficient for SI; experienced premature ejaculation; had SI\par mild nasal congestion otherwise no side effects\par \par 5. 2017 started on testim; no side effects; "felt amazing" for the first several days and then regressed towards pretreatment. Symptoms better than prior to treatment.\par \par 7.19.2017\par Continues on testim one tube daily; had difficulty getting 2 tubes daily; insurance covered brand name but not generic. Energy improved, sexual function with Sildenafil 100 mg; erections good, no prolonged erection. Pleased\par \par 10.19.2017\par again stopped T replacement\par insurance would pay for brand but not generic.\par has been off for several days\par sexual function ok on sildenafil \par \par 7.25.2018\par returns with diminished libido\par seen by internist and repeated T\par T 183\par free T 39.1 \par \par \par 2.13.2019 \par patient is now maintained on AVEED (3rd injection today)\par \par 6.26.2019\par patient maintained on AVEED\par missed injection cycle\par now complaining of loss of libido and energy\par had surgery for deviated septum \par \par 8.14.2019\par patient on AVEED and Arimidex\par Improved energy and sexual functon\par \par 11.13.2019\par patient returns for AVEED and management\par he has stopped  ARIMIDEX (ran out of)\par feeling less energetic during this cycle\par \par 1.30.2020\par patient returns for AVEED\par again is off cycle\par patient embarked on weigh reduction and has last 15 lbs\par \par 4.15.2020 \par The patient-doctor relationship has been established in a face to face fashion via real time video/audio HIPAA compliant communication using telemedicine software.  The patient's identity has been confirmed.  The patient was previously emailed a copy of the telemedicine consent.  They have had a chance to review and has now given verbal consent and has requested care to be assessed and treated through telemedicine and understands there maybe limitations in this process and they may need further follow up care in the office and or hospital settings.   \par THE FOLLOWING WAS READ TO AND CONSENTED TO BY PATIENT\par By virtue of my participation in this telehealth visit, I am consenting to receive care through telehealth. Telehealth is the use of electronic information and communication technologies by providers to deliver health care to patients at a distance. \par I understand that any care provided to me through [employer]’s telehealth application (“the [employer]”) will incorporate security protocols to protect the privacy and security of my health information. If any other application is used to provide care to me, I understand that the technology may not contain appropriate security protocols to protect the privacy and security of my health information. My provider has explained to me the risks associated with the technology platforms that he or she is using to provide care to me. I acknowledge that there are potential risks associated with any technology used while obtaining care through telehealth, including, but not limited to, connectively interruptions, other technical difficulties, and unauthorized access by a third party to one’s health information. Despite these risks, I agree to participate in the telehealth encounter. \par I understand and agree that I or my healthcare provider may terminate a telehealth encounter at any time in the event of a technical malfunction. \par I also understand that my location determines where medicine is being practiced. As a result, I will inform my provider where I am located at the time of my telehealth visit. \par I understand that there may be costs associated with a telehealth visit. I agree that I am responsible for any fees associated with the telehealth services that I receive. \par This Informed Consent for Telehealth Services During a Public Health Emergency will remain in effect solely during the term of the public health emergency.\par \par Verbal consent given on Apr 15, 2020  10:30AM by Bran Mar from Mr. KRYSTIAN SANCHES \par  \par \par Last aveed January 30th\par patient is behind schedule\par anxiety related to COVID19\par sexual activity maintained\par \par 7.21.2020\par The patient-doctor relationship has been established in a face to face fashion via real time video/audio HIPAA compliant communication using telemedicine software.  The patient's identity has been confirmed.  The patient was previously emailed a copy of the telemedicine consent.  They have had a chance to review and has now given verbal consent and has requested care to be assessed and treated through telemedicine and understands there maybe limitations in this process and they may need further follow up care in the office and or hospital settings.   \par THE FOLLOWING WAS READ TO AND CONSENTED TO BY PATIENT\par By virtue of my participation in this telehealth visit, I am consenting to receive care through telehealth. Telehealth is the use of electronic information and communication technologies by providers to deliver health care to patients at a distance. \par I understand that any care provided to me through [employer]s telehealth application (“the [employer]”) will incorporate security protocols to protect the privacy and security of my health information. If any other application is used to provide care to me, I understand that the technology may not contain appropriate security protocols to protect the privacy and security of my health information. My provider has explained to me the risks associated with the technology platforms that he or she is using to provide care to me. I acknowledge that there are potential risks associated with any technology used while obtaining care through telehealth, including, but not limited to, connectively interruptions, other technical difficulties, and unauthorized access by a third party to one’s health information. Despite these risks, I agree to participate in the telehealth encounter. \par I understand and agree that I or my healthcare provider may terminate a telehealth encounter at any time in the event of a technical malfunction. \par I also understand that my location determines where medicine is being practiced. As a result, I will inform my provider where I am located at the time of my telehealth visit. \par I understand that there may be costs associated with a telehealth visit. I agree that I am responsible for any fees associated with the telehealth services that I receive. \par This Informed Consent for Telehealth Services During a Public Health Emergency will remain in effect solely during the term of the public health emergency.\par \par Verbal consent given on Jul 21, 2020  10:00AM by Bran Mar from Mr. KRYSTIAN STAPLETONOUBARRY \par  \par \par \par 9.3.2020\par The patient-doctor relationship has been established in a face to face fashion via real time video/audio HIPAA compliant communication using telemedicine software.  The patient's identity has been confirmed.  The patient was previously emailed a copy of the telemedicine consent.  They have had a chance to review and has now given verbal consent and has requested care to be assessed and treated through telemedicine and understands there maybe limitations in this process and they may need further follow up care in the office and or hospital settings.   \par THE FOLLOWING WAS READ TO AND CONSENTED TO BY PATIENT\par By virtue of my participation in this telehealth visit, I am consenting to receive care through telehealth. Telehealth is the use of electronic information and communication technologies by providers to deliver health care to patients at a distance. \par I understand that any care provided to me through [employer]s telehealth application (“the [employer]”) will incorporate security protocols to protect the privacy and security of my health information. If any other application is used to provide care to me, I understand that the technology may not contain appropriate security protocols to protect the privacy and security of my health information. My provider has explained to me the risks associated with the technology platforms that he or she is using to provide care to me. I acknowledge that there are potential risks associated with any technology used while obtaining care through telehealth, including, but not limited to, connectively interruptions, other technical difficulties, and unauthorized access by a third party to one’s health information. Despite these risks, I agree to participate in the telehealth encounter. \par I understand and agree that I or my healthcare provider may terminate a telehealth encounter at any time in the event of a technical malfunction. \par I also understand that my location determines where medicine is being practiced. As a result, I will inform my provider where I am located at the time of my telehealth visit. \par I understand that there may be costs associated with a telehealth visit. I agree that I am responsible for any fees associated with the telehealth services that I receive. \par This Informed Consent for Telehealth Services During a Public Health Emergency will remain in effect solely during the term of the public health emergency.\par \par Verbal consent given on Sep 03, 2020   3:30PM by Bran Mar from Mr. KRYSTIAN GALVANGIOUDIS \par  \par feeling good; energetic\par excellent erection\par premature ejaculation\par \par \par 1.7.2021\par returns has been on arimidex and one tube testim\par patient felt that he had not been doing as welll\par was feeling less energetic \par less interested in sexual function \par complaining premature ejaculation\par \par 7.29.2021\par The patient-doctor relationship has been established in a face to face fashion via real time video/audio HIPAA compliant communication using telemedicine software.  The patient's identity has been confirmed.  The patient was previously emailed a copy of the telemedicine consent.  They have had a chance to review and has now given verbal consent and has requested care to be assessed and treated through telemedicine and understands there maybe limitations in this process and they may need further follow up care in the office and or hospital settings.   \par THE FOLLOWING WAS READ TO AND CONSENTED TO BY PATIENT\par By virtue of my participation in this telehealth visit, I am consenting to receive care through telehealth. Telehealth is the use of electronic information and communication technologies by providers to deliver health care to patients at a distance. \par I understand that any care provided to me through [employer]s telehealth application (“the [employer]”) will incorporate security protocols to protect the privacy and security of my health information. If any other application is used to provide care to me, I understand that the technology may not contain appropriate security protocols to protect the privacy and security of my health information. My provider has explained to me the risks associated with the technology platforms that he or she is using to provide care to me. I acknowledge that there are potential risks associated with any technology used while obtaining care through telehealth, including, but not limited to, connectively interruptions, other technical difficulties, and unauthorized access by a third party to one’s health information. Despite these risks, I agree to participate in the telehealth encounter. \par I understand and agree that I or my healthcare provider may terminate a telehealth encounter at any time in the event of a technical malfunction. \par I also understand that my location determines where medicine is being practiced. As a result, I will inform my provider where I am located at the time of my telehealth visit. \par I understand that there may be costs associated with a telehealth visit. I agree that I am responsible for any fees associated with the telehealth services that I receive. \par This Informed Consent for Telehealth Services During a Public Health Emergency will remain in effect solely during the term of the public health emergency.\par \par Verbal consent given on Jul 29, 2021   3:00PM by Bran Mar from Mr. KRYSTIAN SANCHES \par \par 9.21.2021\par The patient-doctor relationship has been established in a face to face fashion via real time video/audio HIPAA compliant communication using telemedicine software.  The patient's identity has been confirmed.  The patient was previously emailed a copy of the telemedicine consent.  They have had a chance to review and has now given verbal consent and has requested care to be assessed and treated through telemedicine and understands there maybe limitations in this process and they may need further follow up care in the office and or hospital settings.   \par THE FOLLOWING WAS READ TO AND CONSENTED TO BY PATIENT\par By virtue of my participation in this telehealth visit, I am consenting to receive care through telehealth. Telehealth is the use of electronic information and communication technologies by providers to deliver health care to patients at a distance. \par I understand that any care provided to me through [employer]s telehealth application (“the [employer]”) will incorporate security protocols to protect the privacy and security of my health information. If any other application is used to provide care to me, I understand that the technology may not contain appropriate security protocols to protect the privacy and security of my health information. My provider has explained to me the risks associated with the technology platforms that he or she is using to provide care to me. I acknowledge that there are potential risks associated with any technology used while obtaining care through telehealth, including, but not limited to, connectively interruptions, other technical difficulties, and unauthorized access by a third party to one’s health information. Despite these risks, I agree to participate in the telehealth encounter. \par I understand and agree that I or my healthcare provider may terminate a telehealth encounter at any time in the event of a technical malfunction. \par I also understand that my location determines where medicine is being practiced. As a result, I will inform my provider where I am located at the time of my telehealth visit. \par I understand that there may be costs associated with a telehealth visit. I agree that I am responsible for any fees associated with the telehealth services that I receive. \par This Informed Consent for Telehealth Services During a Public Health Emergency will remain in effect solely during the term of the public health emergency.\par \par Verbal consent given on Sep 21, 2021   2:30PM by Bran Mar from Mr. KRYSTIAN SANCHES \par  MULTIPLE ATTEMPTS AMWELL UNSUCCESSFUL\par [employer] with patient consent and awareness\par \par returns re management of hypogonadism\par \par \par \par \par  9.21.2021\par patient does not feel he is doing as well as he had previously\par has not been taking arimidex\par utilizing Testosterone 50mg %g 1%\par labs reviewed \par patient reports PSA 0.49\par normal testosterone and estradiol\par patient feels erection and energy are not as good\par discussed adding Arimidex but would recommend against in light of prior rise in Hbg/Hct\par \par \par 1.4.22\par \par Father in hospital with COVID, pneumonia, sepsis\par Using sildenafil 60mg which has been less effective\par Pursuing bariatric surgery sleeve technique, discussed benefits for testosterone, estrogen, general well-being\par \par plan:\par 1. continue testosterone\par 2. repeat labs T/Hbg/Hct\par 3. Sildenafil 100mg \par 4. followup in one month\par \par \par . \par 5.17.2022\par patient returns has stopped T secondary to elevated Hbg/Hct\par patient states that he wsa not sleeping well\par no energy\par did not feel well\par has not pursued bariatric surgery losing weight on his own; down to 263 \par discussed the cliniclal need for T replacement and the approach of quarterly therapeutic phlebotomy\par \par P\par \par 2.21.2023\par returns on transdermal T 1.62%\par increased dose to 5 pumps over 1 month\par improved energy\par \par IPSS 5\par ASAD 18\par \par

## 2023-02-21 NOTE — PHYSICAL EXAM
[General Appearance - Well Developed] : well developed [General Appearance - Well Nourished] : well nourished [Normal Appearance] : normal appearance [Well Groomed] : well groomed [General Appearance - In No Acute Distress] : no acute distress [Oriented To Time, Place, And Person] : oriented to person, place, and time [Affect] : the affect was normal [Mood] : the mood was normal [Not Anxious] : not anxious [Urethral Meatus] : meatus normal [Penis Abnormality] : normal circumcised penis [Urinary Bladder Findings] : the bladder was normal on palpation [Scrotum] : the scrotum was normal [Testes Mass (___cm)] : there were no testicular masses [No Prostate Nodules] : no prostate nodules [Prostate Enlargement] : the prostate was not enlarged [FreeTextEntry1] : atrophic testes b/l

## 2023-04-06 ENCOUNTER — TRANSCRIPTION ENCOUNTER (OUTPATIENT)
Age: 53
End: 2023-04-06

## 2023-04-14 LAB
HCT VFR BLD CALC: 51 %
HGB BLD-MCNC: 15.9 G/DL

## 2023-04-18 ENCOUNTER — TRANSCRIPTION ENCOUNTER (OUTPATIENT)
Age: 53
End: 2023-04-18

## 2023-04-18 LAB
ALBUMIN SERPL ELPH-MCNC: 4.7 G/DL
ALP BLD-CCNC: 84 U/L
ALT SERPL-CCNC: 20 U/L
ANION GAP SERPL CALC-SCNC: 13 MMOL/L
AST SERPL-CCNC: 33 U/L
BILIRUB SERPL-MCNC: 0.3 MG/DL
BUN SERPL-MCNC: 16 MG/DL
CALCIUM SERPL-MCNC: 9.9 MG/DL
CHLORIDE SERPL-SCNC: 102 MMOL/L
CO2 SERPL-SCNC: 26 MMOL/L
CREAT SERPL-MCNC: 0.8 MG/DL
EGFR: 107 ML/MIN/1.73M2
ESTRADIOL SERPL-MCNC: 30 PG/ML
GLUCOSE SERPL-MCNC: 106 MG/DL
POTASSIUM SERPL-SCNC: 4.3 MMOL/L
PROT SERPL-MCNC: 7.3 G/DL
SODIUM SERPL-SCNC: 140 MMOL/L
TESTOST SERPL-MCNC: 419 NG/DL

## 2023-04-26 ENCOUNTER — APPOINTMENT (OUTPATIENT)
Dept: INTERNAL MEDICINE | Facility: CLINIC | Age: 53
End: 2023-04-26

## 2023-06-28 ENCOUNTER — APPOINTMENT (OUTPATIENT)
Dept: ENDOCRINOLOGY | Facility: CLINIC | Age: 53
End: 2023-06-28

## 2023-07-12 ENCOUNTER — NON-APPOINTMENT (OUTPATIENT)
Age: 53
End: 2023-07-12

## 2023-07-30 ENCOUNTER — NON-APPOINTMENT (OUTPATIENT)
Age: 53
End: 2023-07-30

## 2023-09-18 ENCOUNTER — APPOINTMENT (OUTPATIENT)
Dept: OTOLARYNGOLOGY | Facility: CLINIC | Age: 53
End: 2023-09-18
Payer: COMMERCIAL

## 2023-09-18 VITALS
SYSTOLIC BLOOD PRESSURE: 120 MMHG | DIASTOLIC BLOOD PRESSURE: 80 MMHG | HEART RATE: 72 BPM | OXYGEN SATURATION: 98 % | TEMPERATURE: 97.2 F | BODY MASS INDEX: 39.9 KG/M2 | WEIGHT: 285 LBS | HEIGHT: 71 IN

## 2023-09-18 PROCEDURE — 99203 OFFICE O/P NEW LOW 30 MIN: CPT | Mod: 25

## 2023-09-18 PROCEDURE — 31575 DIAGNOSTIC LARYNGOSCOPY: CPT

## 2023-10-03 ENCOUNTER — APPOINTMENT (OUTPATIENT)
Dept: UROLOGY | Facility: CLINIC | Age: 53
End: 2023-10-03

## 2023-10-10 ENCOUNTER — APPOINTMENT (OUTPATIENT)
Dept: UROLOGY | Facility: CLINIC | Age: 53
End: 2023-10-10

## 2023-10-12 ENCOUNTER — APPOINTMENT (OUTPATIENT)
Dept: INTERNAL MEDICINE | Facility: CLINIC | Age: 53
End: 2023-10-12
Payer: COMMERCIAL

## 2023-10-12 VITALS
OXYGEN SATURATION: 96 % | HEART RATE: 71 BPM | HEIGHT: 71 IN | TEMPERATURE: 98.2 F | DIASTOLIC BLOOD PRESSURE: 80 MMHG | SYSTOLIC BLOOD PRESSURE: 112 MMHG | RESPIRATION RATE: 16 BRPM | BODY MASS INDEX: 42.84 KG/M2 | WEIGHT: 306 LBS

## 2023-10-12 DIAGNOSIS — Z23 ENCOUNTER FOR IMMUNIZATION: ICD-10-CM

## 2023-10-12 DIAGNOSIS — E78.5 HYPERLIPIDEMIA, UNSPECIFIED: ICD-10-CM

## 2023-10-12 DIAGNOSIS — G47.33 OBSTRUCTIVE SLEEP APNEA (ADULT) (PEDIATRIC): ICD-10-CM

## 2023-10-12 PROCEDURE — G0008: CPT

## 2023-10-12 PROCEDURE — 99396 PREV VISIT EST AGE 40-64: CPT | Mod: 25

## 2023-10-12 PROCEDURE — 90686 IIV4 VACC NO PRSV 0.5 ML IM: CPT

## 2023-10-12 RX ORDER — CLOZAPINE 200 MG/1
TABLET ORAL
Refills: 0 | Status: DISCONTINUED | COMMUNITY
End: 2023-10-12

## 2023-10-12 RX ORDER — SUVOREXANT 20 MG/1
20 TABLET, FILM COATED ORAL
Refills: 0 | Status: ACTIVE | COMMUNITY

## 2023-10-12 RX ORDER — SODIUM PICOSULFATE, MAGNESIUM OXIDE, AND ANHYDROUS CITRIC ACID 10; 3.5; 12 MG/160ML; G/160ML; G/160ML
10-3.5-12 MG-GM LIQUID ORAL
Qty: 1 | Refills: 0 | Status: DISCONTINUED | COMMUNITY
Start: 2022-06-23 | End: 2023-10-12

## 2023-10-12 RX ORDER — SILDENAFIL 100 MG/1
100 TABLET, FILM COATED ORAL
Qty: 6 | Refills: 3 | Status: DISCONTINUED | COMMUNITY
Start: 2022-01-04 | End: 2023-10-12

## 2023-10-23 ENCOUNTER — NON-APPOINTMENT (OUTPATIENT)
Age: 53
End: 2023-10-23

## 2023-10-23 LAB
ALBUMIN SERPL ELPH-MCNC: 4.6 G/DL
ALP BLD-CCNC: 83 U/L
ALT SERPL-CCNC: 19 U/L
ANION GAP SERPL CALC-SCNC: 15 MMOL/L
APPEARANCE: CLEAR
AST SERPL-CCNC: 33 U/L
BACTERIA: NEGATIVE /HPF
BILIRUB SERPL-MCNC: 0.4 MG/DL
BILIRUBIN URINE: NEGATIVE
BLOOD URINE: NEGATIVE
BUN SERPL-MCNC: 16 MG/DL
CALCIUM SERPL-MCNC: 9.5 MG/DL
CAST: 0 /LPF
CHLORIDE SERPL-SCNC: 101 MMOL/L
CHOLEST SERPL-MCNC: 183 MG/DL
CO2 SERPL-SCNC: 25 MMOL/L
COLOR: YELLOW
CREAT SERPL-MCNC: 0.87 MG/DL
EGFR: 104 ML/MIN/1.73M2
EPITHELIAL CELLS: 0 /HPF
ESTIMATED AVERAGE GLUCOSE: 126 MG/DL
GLUCOSE QUALITATIVE U: NEGATIVE MG/DL
GLUCOSE SERPL-MCNC: 97 MG/DL
HBA1C MFR BLD HPLC: 6 %
HCT VFR BLD CALC: 49.6 %
HDLC SERPL-MCNC: 48 MG/DL
HGB BLD-MCNC: 16.3 G/DL
IRON SERPL-MCNC: 73 UG/DL
KETONES URINE: NEGATIVE MG/DL
LDLC SERPL CALC-MCNC: 116 MG/DL
LEUKOCYTE ESTERASE URINE: NEGATIVE
MCHC RBC-ENTMCNC: 30.3 PG
MCHC RBC-ENTMCNC: 32.9 GM/DL
MCV RBC AUTO: 92.2 FL
MICROSCOPIC-UA: NORMAL
NITRITE URINE: NEGATIVE
NONHDLC SERPL-MCNC: 135 MG/DL
PH URINE: 6.5
PLATELET # BLD AUTO: 262 K/UL
POTASSIUM SERPL-SCNC: 4.5 MMOL/L
PROT SERPL-MCNC: 7.1 G/DL
PROTEIN URINE: NEGATIVE MG/DL
PSA SERPL-MCNC: 0.86 NG/ML
RBC # BLD: 5.38 M/UL
RBC # FLD: 14.4 %
RED BLOOD CELLS URINE: 0 /HPF
SODIUM SERPL-SCNC: 140 MMOL/L
SPECIFIC GRAVITY URINE: 1.01
TRIGL SERPL-MCNC: 105 MG/DL
UROBILINOGEN URINE: 0.2 MG/DL
WBC # FLD AUTO: 8.16 K/UL
WHITE BLOOD CELLS URINE: 0 /HPF

## 2023-10-24 ENCOUNTER — APPOINTMENT (OUTPATIENT)
Dept: UROLOGY | Facility: CLINIC | Age: 53
End: 2023-10-24
Payer: COMMERCIAL

## 2023-10-24 PROCEDURE — 99213 OFFICE O/P EST LOW 20 MIN: CPT

## 2023-10-24 RX ORDER — SILDENAFIL 100 MG/1
100 TABLET, FILM COATED ORAL
Qty: 30 | Refills: 3 | Status: ACTIVE | COMMUNITY
Start: 2023-10-24 | End: 1900-01-01

## 2023-10-25 LAB — ESTRADIOL SERPL-MCNC: 42 PG/ML

## 2023-10-29 ENCOUNTER — TRANSCRIPTION ENCOUNTER (OUTPATIENT)
Age: 53
End: 2023-10-29

## 2023-10-29 LAB
TESTOST FREE SERPL-MCNC: 27.8 PG/ML
TESTOST SERPL-MCNC: 462 NG/DL

## 2023-10-30 ENCOUNTER — TRANSCRIPTION ENCOUNTER (OUTPATIENT)
Age: 53
End: 2023-10-30

## 2023-11-29 RX ORDER — TESTOSTERONE 16.2 MG/G
20.25 MG/ACT GEL TRANSDERMAL
Qty: 3 | Refills: 0 | Status: ACTIVE | COMMUNITY
Start: 2022-05-19 | End: 1900-01-01

## 2023-12-01 ENCOUNTER — NON-APPOINTMENT (OUTPATIENT)
Age: 53
End: 2023-12-01

## 2024-01-01 DIAGNOSIS — U07.1 COVID-19: ICD-10-CM

## 2024-01-01 RX ORDER — NIRMATRELVIR AND RITONAVIR 300-100 MG
20 X 150 MG & KIT ORAL
Qty: 1 | Refills: 0 | Status: ACTIVE | COMMUNITY
Start: 2024-01-01 | End: 1900-01-01

## 2024-02-06 ENCOUNTER — NON-APPOINTMENT (OUTPATIENT)
Age: 54
End: 2024-02-06

## 2024-02-21 ENCOUNTER — APPOINTMENT (OUTPATIENT)
Dept: UROLOGY | Facility: CLINIC | Age: 54
End: 2024-02-21
Payer: COMMERCIAL

## 2024-02-21 DIAGNOSIS — R68.82 DECREASED LIBIDO: ICD-10-CM

## 2024-02-21 PROCEDURE — 99443: CPT | Mod: 93

## 2024-03-25 ENCOUNTER — APPOINTMENT (OUTPATIENT)
Dept: INTERNAL MEDICINE | Facility: CLINIC | Age: 54
End: 2024-03-25

## 2024-05-29 ENCOUNTER — NON-APPOINTMENT (OUTPATIENT)
Age: 54
End: 2024-05-29

## 2024-05-30 ENCOUNTER — LABORATORY RESULT (OUTPATIENT)
Age: 54
End: 2024-05-30

## 2024-05-30 ENCOUNTER — NON-APPOINTMENT (OUTPATIENT)
Age: 54
End: 2024-05-30

## 2024-05-31 ENCOUNTER — TRANSCRIPTION ENCOUNTER (OUTPATIENT)
Age: 54
End: 2024-05-31

## 2024-06-01 ENCOUNTER — TRANSCRIPTION ENCOUNTER (OUTPATIENT)
Age: 54
End: 2024-06-01

## 2024-06-11 ENCOUNTER — APPOINTMENT (OUTPATIENT)
Dept: UROLOGY | Facility: CLINIC | Age: 54
End: 2024-06-11
Payer: COMMERCIAL

## 2024-06-11 DIAGNOSIS — R19.7 DIARRHEA, UNSPECIFIED: ICD-10-CM

## 2024-06-11 PROCEDURE — 99214 OFFICE O/P EST MOD 30 MIN: CPT

## 2024-06-11 PROCEDURE — G2211 COMPLEX E/M VISIT ADD ON: CPT

## 2024-06-11 RX ORDER — ANASTROZOLE TABLETS 1 MG/1
1 TABLET ORAL
Qty: 30 | Refills: 1 | Status: ACTIVE | COMMUNITY
Start: 2024-06-11 | End: 1900-01-01

## 2024-06-11 NOTE — HISTORY OF PRESENT ILLNESS
[Home] : at home, [unfilled] , at the time of the visit. [Medical Office: ([facility])___] : at the medical office located in  [Verbal consent obtained from patient] : the patient, [unfilled] [FreeTextEntry1] : Patient referred by Dr Ankur Abel for sexual dysfunction. Patient complaining of loss of libido x 3 months. No evaluation. Erections generally good. One one occasion lost erection during SI.  2.5 (second marriage) years 2 step children; and one 1.5 year old son. Son with neutropenia (viral) Couple very anxious  4.26.2017 patient took only 40 mg of sildenafil ; 4/10 rigidity One hour later had partial tumescence Sufficient for SI; experienced premature ejaculation; had SI mild nasal congestion otherwise no side effects  5. 2017 started on testim; no side effects; "felt amazing" for the first several days and then regressed towards pretreatment. Symptoms better than prior to treatment.  7.19.2017 Continues on testim one tube daily; had difficulty getting 2 tubes daily; insurance covered brand name but not generic. Energy improved, sexual function with Sildenafil 100 mg; erections good, no prolonged erection. Pleased  10.19.2017 again stopped T replacement insurance would pay for brand but not generic. has been off for several days sexual function ok on sildenafil   7.25.2018 returns with diminished libido seen by internist and repeated T T 183 free T 39.1    2.13.2019  patient is now maintained on AVEED (3rd injection today)  6.26.2019 patient maintained on AVEED missed injection cycle now complaining of loss of libido and energy had surgery for deviated septum   8.14.2019 patient on AVEED and Arimidex Improved energy and sexual functon  11.13.2019 patient returns for AVEED and management he has stopped  ARIMIDEX (ran out of) feeling less energetic during this cycle  1.30.2020 patient returns for AVEED again is off cycle patient embarked on weigh reduction and has last 15 lbs  4.15.2020  The patient-doctor relationship has been established in a face to face fashion via real time video/audio HIPAA compliant communication using telemedicine software.  The patient's identity has been confirmed.  The patient was previously emailed a copy of the telemedicine consent.  They have had a chance to review and has now given verbal consent and has requested care to be assessed and treated through telemedicine and understands there maybe limitations in this process and they may need further follow up care in the office and or hospital settings.    THE FOLLOWING WAS READ TO AND CONSENTED TO BY PATIENT By virtue of my participation in this telehealth visit, I am consenting to receive care through telehealth. Telehealth is the use of electronic information and communication technologies by providers to deliver health care to patients at a distance.  I understand that any care provided to me through [employer] telehealth application ("the [employer]esha") will incorporate security protocols to protect the privacy and security of my health information. If any other application is used to provide care to me, I understand that the technology may not contain appropriate security protocols to protect the privacy and security of my health information. My provider has explained to me the risks associated with the technology platforms that he or she is using to provide care to me. I acknowledge that there are potential risks associated with any technology used while obtaining care through telehealth, including, but not limited to, connectively interruptions, other technical difficulties, and unauthorized access by a third party to one's health information. Despite these risks, I agree to participate in the telehealth encounter.  I understand and agree that I or my healthcare provider may terminate a telehealth encounter at any time in the event of a technical malfunction.  I also understand that my location determines where medicine is being practiced. As a result, I will inform my provider where I am located at the time of my telehealth visit.  I understand that there may be costs associated with a telehealth visit. I agree that I am responsible for any fees associated with the telehealth services that I receive.  This Informed Consent for Telehealth Services During a Public Health Emergency will remain in effect solely during the term of the public health emergency.  Verbal consent given on Apr 15, 2020  10:30AM by Bran Mar from Mr. KRYSTIAN SANCHES     Last aveed January 30th patient is behind schedule anxiety related to COVID19 sexual activity maintained  7.21.2020 The patient-doctor relationship has been established in a face to face fashion via real time video/audio HIPAA compliant communication using telemedicine software.  The patient's identity has been confirmed.  The patient was previously emailed a copy of the telemedicine consent.  They have had a chance to review and has now given verbal consent and has requested care to be assessed and treated through telemedicine and understands there maybe limitations in this process and they may need further follow up care in the office and or hospital settings.    THE FOLLOWING WAS READ TO AND CONSENTED TO BY PATIENT By virtue of my participation in this telehealth visit, I am consenting to receive care through telehealth. Telehealth is the use of electronic information and communication technologies by providers to deliver health care to patients at a distance.  I understand that any care provided to me through [employer]s telehealth application ("the [employer] kesha") will incorporate security protocols to protect the privacy and security of my health information. If any other application is used to provide care to me, I understand that the technology may not contain appropriate security protocols to protect the privacy and security of my health information. My provider has explained to me the risks associated with the technology platforms that he or she is using to provide care to me. I acknowledge that there are potential risks associated with any technology used while obtaining care through telehealth, including, but not limited to, connectively interruptions, other technical difficulties, and unauthorized access by a third party to one's health information. Despite these risks, I agree to participate in the telehealth encounter.  I understand and agree that I or my healthcare provider may terminate a telehealth encounter at any time in the event of a technical malfunction.  I also understand that my location determines where medicine is being practiced. As a result, I will inform my provider where I am located at the time of my telehealth visit.  I understand that there may be costs associated with a telehealth visit. I agree that I am responsible for any fees associated with the telehealth services that I receive.  This Informed Consent for Telehealth Services During a Public Health Emergency will remain in effect solely during the term of the public health emergency.  Verbal consent given on Jul 21, 2020  10:00AM by Bran Mar from Mr. KRYSTIAN SANCHES      9.3.2020 The patient-doctor relationship has been established in a face to face fashion via real time video/audio HIPAA compliant communication using telemedicine software.  The patient's identity has been confirmed.  The patient was previously emailed a copy of the telemedicine consent.  They have had a chance to review and has now given verbal consent and has requested care to be assessed and treated through telemedicine and understands there maybe limitations in this process and they may need further follow up care in the office and or hospital settings.    THE FOLLOWING WAS READ TO AND CONSENTED TO BY PATIENT By virtue of my participation in this telehealth visit, I am consenting to receive care through telehealth. Telehealth is the use of electronic information and communication technologies by providers to deliver health care to patients at a distance.  I understand that any care provided to me through [employer]s telehealth application ("the [employer]ha") will incorporate security protocols to protect the privacy and security of my health information. If any other application is used to provide care to me, I understand that the technology may not contain appropriate security protocols to protect the privacy and security of my health information. My provider has explained to me the risks associated with the technology platforms that he or she is using to provide care to me. I acknowledge that there are potential risks associated with any technology used while obtaining care through telehealth, including, but not limited to, connectively interruptions, other technical difficulties, and unauthorized access by a third party to one's health information. Despite these risks, I agree to participate in the telehealth encounter.  I understand and agree that I or my healthcare provider may terminate a telehealth encounter at any time in the event of a technical malfunction.  I also understand that my location determines where medicine is being practiced. As a result, I will inform my provider where I am located at the time of my telehealth visit.  I understand that there may be costs associated with a telehealth visit. I agree that I am responsible for any fees associated with the telehealth services that I receive.  This Informed Consent for Telehealth Services During a Public Health Emergency will remain in effect solely during the term of the public health emergency.  Verbal consent given on Sep 03, 2020   3:30PM by Bran Mar from Mr. KRYSTIAN SANCHES    feeling good; energetic excellent erection premature ejaculation   1.7.2021 returns has been on arimidex and one tube testim patient felt that he had not been doing as welll was feeling less energetic  less interested in sexual function  complaining premature ejaculation  7.29.2021 The patient-doctor relationship has been established in a face to face fashion via real time video/audio HIPAA compliant communication using telemedicine software.  The patient's identity has been confirmed.  The patient was previously emailed a copy of the telemedicine consent.  They have had a chance to review and has now given verbal consent and has requested care to be assessed and treated through telemedicine and understands there maybe limitations in this process and they may need further follow up care in the office and or hospital settings.    THE FOLLOWING WAS READ TO AND CONSENTED TO BY PATIENT By virtue of my participation in this telehealth visit, I am consenting to receive care through telehealth. Telehealth is the use of electronic information and communication technologies by providers to deliver health care to patients at a distance.  I understand that any care provided to me through [employer]s telehealth application ("the [employer]") will incorporate security protocols to protect the privacy and security of my health information. If any other application is used to provide care to me, I understand that the technology may not contain appropriate security protocols to protect the privacy and security of my health information. My provider has explained to me the risks associated with the technology platforms that he or she is using to provide care to me. I acknowledge that there are potential risks associated with any technology used while obtaining care through telehealth, including, but not limited to, connectively interruptions, other technical difficulties, and unauthorized access by a third party to one's health information. Despite these risks, I agree to participate in the telehealth encounter.  I understand and agree that I or my healthcare provider may terminate a telehealth encounter at any time in the event of a technical malfunction.  I also understand that my location determines where medicine is being practiced. As a result, I will inform my provider where I am located at the time of my telehealth visit.  I understand that there may be costs associated with a telehealth visit. I agree that I am responsible for any fees associated with the telehealth services that I receive.  This Informed Consent for Telehealth Services During a Public Health Emergency will remain in effect solely during the term of the public health emergency.  Verbal consent given on Jul 29, 2021   3:00PM by Bran Mar from Mr. KRYSTIAN SANCHES   9.21.2021 The patient-doctor relationship has been established in a face to face fashion via real time video/audio HIPAA compliant communication using telemedicine software.  The patient's identity has been confirmed.  The patient was previously emailed a copy of the telemedicine consent.  They have had a chance to review and has now given verbal consent and has requested care to be assessed and treated through telemedicine and understands there maybe limitations in this process and they may need further follow up care in the office and or hospital settings.    THE FOLLOWING WAS READ TO AND CONSENTED TO BY PATIENT By virtue of my participation in this telehealth visit, I am consenting to receive care through telehealth. Telehealth is the use of electronic information and communication technologies by providers to deliver health care to patients at a distance.  I understand that any care provided to me through [employer]s telehealth application ("the [employer]") will incorporate security protocols to protect the privacy and security of my health information. If any other application is used to provide care to me, I understand that the technology may not contain appropriate security protocols to protect the privacy and security of my health information. My provider has explained to me the risks associated with the technology platforms that he or she is using to provide care to me. I acknowledge that there are potential risks associated with any technology used while obtaining care through telehealth, including, but not limited to, connectively interruptions, other technical difficulties, and unauthorized access by a third party to one's health information. Despite these risks, I agree to participate in the telehealth encounter.  I understand and agree that I or my healthcare provider may terminate a telehealth encounter at any time in the event of a technical malfunction.  I also understand that my location determines where medicine is being practiced. As a result, I will inform my provider where I am located at the time of my telehealth visit.  I understand that there may be costs associated with a telehealth visit. I agree that I am responsible for any fees associated with the telehealth services that I receive.  This Informed Consent for Telehealth Services During a Public Health Emergency will remain in effect solely during the term of the public health emergency.  Verbal consent given on Sep 21, 2021   2:30PM by Bran Mar from Mr. KRYSTIAN SANCHES   MULTIPLE ATTEMPTS AMWELL UNSUCCESSFUL [employer] with patient consent and awareness  returns re management of hypogonadism      9.21.2021 patient does not feel he is doing as well as he had previously has not been taking arimidex utilizing Testosterone 50mg %g 1% labs reviewed  patient reports PSA 0.49 normal testosterone and estradiol patient feels erection and energy are not as good discussed adding Arimidex but would recommend against in light of prior rise in Hbg/Hct   1.4.22  Father in hospital with COVID, pneumonia, sepsis Using sildenafil 60mg which has been less effective Pursuing bariatric surgery sleeve technique, discussed benefits for testosterone, estrogen, general well-being  plan: 1. continue testosterone 2. repeat labs T/Hbg/Hct 3. Sildenafil 100mg  4. followup in one month   .  5.17.2022 patient returns has stopped T secondary to elevated Hbg/Hct patient states that he wsa not sleeping well no energy did not feel well has not pursued bariatric surgery losing weight on his own; down to 263  discussed the cliniclal need for T replacement and the approach of quarterly therapeutic phlebotomy  P  2.21.2023 returns on transdermal T 1.62% increased dose to 5 pumps over 1 month improved energy  IPSS 5 ASAD 18  10.24.2023 returns on 5 pumps daily energy and libido not as good as when in packets decreased responsiveness and erections  2.21.2024 patient utilizing 4 pumps  6.11.2024 returns on 3 pumps / 4 pumps of T complaining of decreased libido poor sleep erections responds to Sildenafil 50 mg  [Other Location: e.g. School (Enter Location, City,State)___] : at [unfilled], at the time of the visit.

## 2024-06-11 NOTE — ASSESSMENT
[FreeTextEntry1] : Patient had been on maintenance aveed  Unable to continue because of pandemic  Will restart on Testim   We had an extensive discussion re Risks of T replacement We discussed risk to T exposure to partners and children from transdermal appoaches. Will restart Labs in 3 weeks if able to go to lab in light of pandemic (patient does not want to go out) fu in 10 weeks   7.21.2020 one packet of testosterone gel TESTIM was not covered by insurance 120 dollars per month energy levels; sexual function good when on reemphasized transmission risk protocol reviewed likes it better than AVEED will get labs in 3 weeks since patient ran out fu in 4 weeks    9.3.2020 patient pleased with gel good libido and sense of well being excellent erection ? premature ejaculation labs reviewed discussed estradiol increase had been on Arimidex will restart labs in 3 weeks  fu appt in 4 weeks    1.7.2021 patient has run out of Testosterone and arimidex labs from 12.2000 reviewed excellent T/E2 and H/H will restart discussed premature ejaculation PREMATURE EJACULATION The patient and I discussed the guidelines for the treatment of premature ejaculation. The patient reports that his symptoms cause him distress. Discussed squeeze technique and sensate focus approach Patient endorses that he attempts to "distance' himself and then ejaculates.  Second line therapy commonly includes SSRI's.  The NICK MONOGIOUDIS expressed fully understanding of the information provided, the consequences and the management.. He does not want to take additional medication and will attempt behavioural techniques Sexual dysfunction worse off oft T taking sildenafil continue current regimen Discussed PSA monitoring and controversy. Turning 51 Patient wants to proceed, labs in 3 months (h/h , PSA, T and Es followup in 3 months  July 29, 2021 Patient seen with telemedicine. He is maintained on testosterone 1% transdermal gel he has been doing well. He felt that his premature ejaculation was better while on anastrozole. He has not been on this. He does not feel premature ejaculation  He has not had bloods done. He ran out of testosterone 1 week ago. He feels that his sexual function has decreased. His energy has decreased. However he does feel well.  He wishes to restart his testosterone supplementation. We discussed the need for baseline blood studies. We discussed his previous polycythemia after testosterone. We discussed PSA screening. He wished to undergo PSA screening.  Plan: 1. Renewal of testosterone 50 mg / 5 g 1% 2. Testosterone, PSA estradiol, hemoglobin hematocrit 3. Office visit in 1 month with labs at that time    9.21.2021  patient does not feel he is doing as well as he had previously  has not been taking arimidex  utilizing Testosterone 50mg %g 1%  labs reviewed  patient reports PSA 0.49  normal testosterone and estradiol  patient feels erection and energy are not as good  discussed adding Arimidex but would recommend against in light of prior rise in Hbg/Hct      1.4.22 Father in hospital with COVID, pneumonia, sepsis Using sildenafil 60mg which has been less effective Pursuing bariatric surgery sleeve technique, discussed benefits for testosterone, estrogen, general well-being   plan: 1. continue testosterone 2. repeat labs T/Hbg/Hct 3. Sildenafil 100mg 4. followup in one month    5.17.2022  Patient returns regarding hypogonadism, sexual dysfunction and loss of energy. He states that he stopped taking transdermal testosterone as instructed after he was found to have colon 1. Hyperestrogenism, 2. Elevated hemoglobin and hematocrit. Since that time he is occasionally placed gel in light of loss of libido and energy and sexual function. He states he still responds to the phosphodiesterase inhibitor; however, he states he has loss of libido and energy. He is not pleased with his symptoms. He wishes to restart testosterone. We discussed the risk of restarting testosterone we reviewed his previous testosterone levels. He has not had significant elevation of his testosterone; his values are within the desired range. He has has had marked elevation of his estrogen and was placed on anastrozole. He states that while on anastrozole he felt markedly better with resolution of premature ejaculation etc. In told him that I did not think that this was likely to be the effects of anastrozole. Anastrozole would only be appropriate if he was found to have elevated estrogen. We discussed the fact that he might require periodic therapeutic phlebotomy if we restart his testosterone. He expressed full understanding and willingness to do this.    Also has noticed the fact that he is lost about 30 pounds since last seen. We discussed the potential impact of this on his testosterone and estrogen metabolism. Therefore we plan to proceed with new studies of his current testosterone, hemoglobin, hematocrit, estradiol etc. depending on the results of this he will undergo treatment with:  1. Transdermal testosterone  2. Transdermal testosterone and anastrozole or 3, anastrozole  4. Continue sildenafil 50 mg  The KRYSTIAN SANCHES expressed fully understanding of the information provided, the consequences and the management.  10.24.2023 Patient returns on 5 pounds of transdermal testosterone daily and anastrozole.  He states that he does not feel as energetic or of the penis.  He was supposed to have bloods drawn last week but did not have the stent.  We discussed the need to continue his current regimen and assess his testosterone and estradiol prior to altering his regimen.  We discussed his weight.  He is considering going on Ozempic.  He is utilizing sildenafil  Plan: 1. Testosterone 2 estradiol 3. renewal sildenafil  2.21.2024 started ozempic was not effective and has recently restarted; lost 5 lb;; decreased apeitite labs reviewed feels good on 4 pump (no differnect from 5); good energy, libido and sexual function pleased on reduced dose  has stopped anastrozole sexual function good  utilizes sildenafil as needed premature ejaculation resolved  Plan: 1. renewal T 2. labs T, Estradiol, H.H 3+/- anastrazole 4. sildenafil  6.11.2024 patient wants to resume anastrozole labs reviewed  increased estradiol complaining of loss of libido and sleeplessness discussed stresses of life (step daughter etc) continue on  4 pumps add anastrazole labs in 3 weeks fu to review labs and esponse The KRYSTIAN SANCHES  expressed fully understanding of the information provided, the consequences and the management. followup in weeks

## 2024-06-12 RX ORDER — TESTOSTERONE 16.2 MG/G
20.25 MG/ACT GEL TRANSDERMAL
Qty: 6 | Refills: 0 | Status: ACTIVE | COMMUNITY
Start: 2023-12-06 | End: 1900-01-01

## 2024-06-24 ENCOUNTER — LABORATORY RESULT (OUTPATIENT)
Age: 54
End: 2024-06-24

## 2024-06-25 PROBLEM — Z87.442 PERSONAL HISTORY OF KIDNEY STONES: Status: ACTIVE | Noted: 2019-10-17

## 2024-06-25 PROBLEM — E28.0 ESTROGEN INCREASED: Status: ACTIVE | Noted: 2019-06-26

## 2024-06-25 PROBLEM — Z79.890 LONG-TERM CURRENT USE OF TESTOSTERONE REPLACEMENT THERAPY: Status: ACTIVE | Noted: 2021-01-05

## 2024-06-25 PROBLEM — F52.4 PREMATURE EJACULATION: Status: ACTIVE | Noted: 2021-01-07

## 2024-06-25 PROBLEM — E29.1 HYPOGONADISM IN MALE: Status: ACTIVE | Noted: 2017-04-26

## 2024-06-25 PROBLEM — N52.9 ERECTILE DYSFUNCTION: Status: ACTIVE | Noted: 2022-01-04

## 2024-06-25 PROBLEM — Z12.5 PROSTATE CANCER SCREENING: Status: ACTIVE | Noted: 2021-01-07

## 2024-06-26 ENCOUNTER — APPOINTMENT (OUTPATIENT)
Dept: UROLOGY | Facility: CLINIC | Age: 54
End: 2024-06-26

## 2024-06-26 DIAGNOSIS — F52.4 PREMATURE EJACULATION: ICD-10-CM

## 2024-06-26 DIAGNOSIS — N40.1 BENIGN PROSTATIC HYPERPLASIA WITH LOWER URINARY TRACT SYMPMS: ICD-10-CM

## 2024-06-26 DIAGNOSIS — Z87.442 PERSONAL HISTORY OF URINARY CALCULI: ICD-10-CM

## 2024-06-26 DIAGNOSIS — N52.9 MALE ERECTILE DYSFUNCTION, UNSPECIFIED: ICD-10-CM

## 2024-06-26 DIAGNOSIS — N13.8 BENIGN PROSTATIC HYPERPLASIA WITH LOWER URINARY TRACT SYMPMS: ICD-10-CM

## 2024-06-26 DIAGNOSIS — E28.0 ESTROGEN EXCESS: ICD-10-CM

## 2024-06-26 DIAGNOSIS — Z79.890 HORMONE REPLACEMENT THERAPY: ICD-10-CM

## 2024-06-26 DIAGNOSIS — Z12.5 ENCOUNTER FOR SCREENING FOR MALIGNANT NEOPLASM OF PROSTATE: ICD-10-CM

## 2024-06-26 DIAGNOSIS — E29.1 TESTICULAR HYPOFUNCTION: ICD-10-CM

## 2024-06-26 PROCEDURE — 99443: CPT | Mod: 93

## 2024-07-29 ENCOUNTER — NON-APPOINTMENT (OUTPATIENT)
Age: 54
End: 2024-07-29

## 2024-07-30 ENCOUNTER — APPOINTMENT (OUTPATIENT)
Dept: UROLOGY | Facility: CLINIC | Age: 54
End: 2024-07-30

## 2024-07-30 VITALS
OXYGEN SATURATION: 97 % | HEART RATE: 76 BPM | DIASTOLIC BLOOD PRESSURE: 93 MMHG | TEMPERATURE: 97.6 F | SYSTOLIC BLOOD PRESSURE: 133 MMHG

## 2024-07-30 DIAGNOSIS — F52.4 PREMATURE EJACULATION: ICD-10-CM

## 2024-07-30 DIAGNOSIS — Z12.5 ENCOUNTER FOR SCREENING FOR MALIGNANT NEOPLASM OF PROSTATE: ICD-10-CM

## 2024-07-30 DIAGNOSIS — N52.9 MALE ERECTILE DYSFUNCTION, UNSPECIFIED: ICD-10-CM

## 2024-07-30 DIAGNOSIS — N13.8 BENIGN PROSTATIC HYPERPLASIA WITH LOWER URINARY TRACT SYMPMS: ICD-10-CM

## 2024-07-30 DIAGNOSIS — Z00.00 ENCOUNTER FOR GENERAL ADULT MEDICAL EXAMINATION W/OUT ABNORMAL FINDINGS: ICD-10-CM

## 2024-07-30 DIAGNOSIS — E66.9 OBESITY, UNSPECIFIED: ICD-10-CM

## 2024-07-30 DIAGNOSIS — R19.7 DIARRHEA, UNSPECIFIED: ICD-10-CM

## 2024-07-30 DIAGNOSIS — E29.1 TESTICULAR HYPOFUNCTION: ICD-10-CM

## 2024-07-30 DIAGNOSIS — N40.1 BENIGN PROSTATIC HYPERPLASIA WITH LOWER URINARY TRACT SYMPMS: ICD-10-CM

## 2024-07-30 DIAGNOSIS — D75.1 SECONDARY POLYCYTHEMIA: ICD-10-CM

## 2024-07-30 DIAGNOSIS — E28.0 ESTROGEN EXCESS: ICD-10-CM

## 2024-07-30 DIAGNOSIS — Z79.890 HORMONE REPLACEMENT THERAPY: ICD-10-CM

## 2024-07-30 DIAGNOSIS — E66.01 MORBID (SEVERE) OBESITY DUE TO EXCESS CALORIES: ICD-10-CM

## 2024-07-30 DIAGNOSIS — Z87.442 PERSONAL HISTORY OF URINARY CALCULI: ICD-10-CM

## 2024-07-30 DIAGNOSIS — R68.82 DECREASED LIBIDO: ICD-10-CM

## 2024-07-30 PROCEDURE — G2211 COMPLEX E/M VISIT ADD ON: CPT | Mod: NC

## 2024-07-30 PROCEDURE — 99213 OFFICE O/P EST LOW 20 MIN: CPT

## 2024-07-30 NOTE — HISTORY OF PRESENT ILLNESS
[Other Location: e.g. School (Enter Location, City,State)___] : at [unfilled], at the time of the visit. [Medical Office: ([facility])___] : at the medical office located in  [Verbal consent obtained from patient] : the patient, [unfilled] [FreeTextEntry1] : Patient referred by Dr nAkur Abel for sexual dysfunction. Patient complaining of loss of libido x 3 months. No evaluation. Erections generally good. One one occasion lost erection during SI.  2.5 (second marriage) years 2 step children; and one 1.5 year old son. Son with neutropenia (viral) Couple very anxious  4.26.2017 patient took only 40 mg of sildenafil ; 4/10 rigidity One hour later had partial tumescence Sufficient for SI; experienced premature ejaculation; had SI mild nasal congestion otherwise no side effects  5. 2017 started on testim; no side effects; "felt amazing" for the first several days and then regressed towards pretreatment. Symptoms better than prior to treatment.  7.19.2017 Continues on testim one tube daily; had difficulty getting 2 tubes daily; insurance covered brand name but not generic. Energy improved, sexual function with Sildenafil 100 mg; erections good, no prolonged erection. Pleased  10.19.2017 again stopped T replacement insurance would pay for brand but not generic. has been off for several days sexual function ok on sildenafil   7.25.2018 returns with diminished libido seen by internist and repeated T T 183 free T 39.1    2.13.2019  patient is now maintained on AVEED (3rd injection today)  6.26.2019 patient maintained on AVEED missed injection cycle now complaining of loss of libido and energy had surgery for deviated septum   8.14.2019 patient on AVEED and Arimidex Improved energy and sexual functon  11.13.2019 patient returns for AVEED and management he has stopped  ARIMIDEX (ran out of) feeling less energetic during this cycle  1.30.2020 patient returns for AVEED again is off cycle patient embarked on weigh reduction and has last 15 lbs  4.15.2020  The patient-doctor relationship has been established in a face to face fashion via real time video/audio HIPAA compliant communication using telemedicine software.  The patient's identity has been confirmed.  The patient was previously emailed a copy of the telemedicine consent.  They have had a chance to review and has now given verbal consent and has requested care to be assessed and treated through telemedicine and understands there maybe limitations in this process and they may need further follow up care in the office and or hospital settings.    THE FOLLOWING WAS READ TO AND CONSENTED TO BY PATIENT By virtue of my participation in this telehealth visit, I am consenting to receive care through telehealth. Telehealth is the use of electronic information and communication technologies by providers to deliver health care to patients at a distance.  I understand that any care provided to me through [employer]'s telehealth application ("the [employer] kesha") will incorporate security protocols to protect the privacy and security of my health information. If any other application is used to provide care to me, I understand that the technology may not contain appropriate security protocols to protect the privacy and security of my health information. My provider has explained to me the risks associated with the technology platforms that he or she is using to provide care to me. I acknowledge that there are potential risks associated with any technology used while obtaining care through telehealth, including, but not limited to, connectively interruptions, other technical difficulties, and unauthorized access by a third party to one's health information. Despite these risks, I agree to participate in the telehealth encounter.  I understand and agree that I or my healthcare provider may terminate a telehealth encounter at any time in the event of a technical malfunction.  I also understand that my location determines where medicine is being practiced. As a result, I will inform my provider where I am located at the time of my telehealth visit.  I understand that there may be costs associated with a telehealth visit. I agree that I am responsible for any fees associated with the telehealth services that I receive.  This Informed Consent for Telehealth Services During a Public Health Emergency will remain in effect solely during the term of the public health emergency.  Verbal consent given on Apr 15, 2020  10:30AM by Bran Mar from Mr. KRYSTIAN SANCHES     Last aveed January 30th patient is behind schedule anxiety related to COVID19 sexual activity maintained  7.21.2020 The patient-doctor relationship has been established in a face to face fashion via real time video/audio HIPAA compliant communication using telemedicine software.  The patient's identity has been confirmed.  The patient was previously emailed a copy of the telemedicine consent.  They have had a chance to review and has now given verbal consent and has requested care to be assessed and treated through telemedicine and understands there maybe limitations in this process and they may need further follow up care in the office and or hospital settings.    THE FOLLOWING WAS READ TO AND CONSENTED TO BY PATIENT By virtue of my participation in this telehealth visit, I am consenting to receive care through telehealth. Telehealth is the use of electronic information and communication technologies by providers to deliver health care to patients at a distance.  I understand that any care provided to me through [employer]s telehealth application ("the [employer] kesha") will incorporate security protocols to protect the privacy and security of my health information. If any other application is used to provide care to me, I understand that the technology may not contain appropriate security protocols to protect the privacy and security of my health information. My provider has explained to me the risks associated with the technology platforms that he or she is using to provide care to me. I acknowledge that there are potential risks associated with any technology used while obtaining care through telehealth, including, but not limited to, connectively interruptions, other technical difficulties, and unauthorized access by a third party to one's health information. Despite these risks, I agree to participate in the telehealth encounter.  I understand and agree that I or my healthcare provider may terminate a telehealth encounter at any time in the event of a technical malfunction.  I also understand that my location determines where medicine is being practiced. As a result, I will inform my provider where I am located at the time of my telehealth visit.  I understand that there may be costs associated with a telehealth visit. I agree that I am responsible for any fees associated with the telehealth services that I receive.  This Informed Consent for Telehealth Services During a Public Health Emergency will remain in effect solely during the term of the public health emergency.  Verbal consent given on Jul 21, 2020  10:00AM by Bran Mar from Mr. KRYSTIAN SANCHES      9.3.2020 The patient-doctor relationship has been established in a face to face fashion via real time video/audio HIPAA compliant communication using telemedicine software.  The patient's identity has been confirmed.  The patient was previously emailed a copy of the telemedicine consent.  They have had a chance to review and has now given verbal consent and has requested care to be assessed and treated through telemedicine and understands there maybe limitations in this process and they may need further follow up care in the office and or hospital settings.    THE FOLLOWING WAS READ TO AND CONSENTED TO BY PATIENT By virtue of my participation in this telehealth visit, I am consenting to receive care through telehealth. Telehealth is the use of electronic information and communication technologies by providers to deliver health care to patients at a distance.  I understand that any care provided to me through [employer]s telehealth application ("the [employer]") will incorporate security protocols to protect the privacy and security of my health information. If any other application is used to provide care to me, I understand that the technology may not contain appropriate security protocols to protect the privacy and security of my health information. My provider has explained to me the risks associated with the technology platforms that he or she is using to provide care to me. I acknowledge that there are potential risks associated with any technology used while obtaining care through telehealth, including, but not limited to, connectively interruptions, other technical difficulties, and unauthorized access by a third party to one's health information. Despite these risks, I agree to participate in the telehealth encounter.  I understand and agree that I or my healthcare provider may terminate a telehealth encounter at any time in the event of a technical malfunction.  I also understand that my location determines where medicine is being practiced. As a result, I will inform my provider where I am located at the time of my telehealth visit.  I understand that there may be costs associated with a telehealth visit. I agree that I am responsible for any fees associated with the telehealth services that I receive.  This Informed Consent for Telehealth Services During a Public Health Emergency will remain in effect solely during the term of the public health emergency.  Verbal consent given on Sep 03, 2020   3:30PM by Bran Mar from Mr. KRYSTIAN SANCHES    feeling good; energetic excellent erection premature ejaculation   1.7.2021 returns has been on arimidex and one tube testim patient felt that he had not been doing as welll was feeling less energetic  less interested in sexual function  complaining premature ejaculation  7.29.2021 The patient-doctor relationship has been established in a face to face fashion via real time video/audio HIPAA compliant communication using telemedicine software.  The patient's identity has been confirmed.  The patient was previously emailed a copy of the telemedicine consent.  They have had a chance to review and has now given verbal consent and has requested care to be assessed and treated through telemedicine and understands there maybe limitations in this process and they may need further follow up care in the office and or hospital settings.    THE FOLLOWING WAS READ TO AND CONSENTED TO BY PATIENT By virtue of my participation in this telehealth visit, I am consenting to receive care through telehealth. Telehealth is the use of electronic information and communication technologies by providers to deliver health care to patients at a distance.  I understand that any care provided to me through [employer]s telehealth application ("the [employer]") will incorporate security protocols to protect the privacy and security of my health information. If any other application is used to provide care to me, I understand that the technology may not contain appropriate security protocols to protect the privacy and security of my health information. My provider has explained to me the risks associated with the technology platforms that he or she is using to provide care to me. I acknowledge that there are potential risks associated with any technology used while obtaining care through telehealth, including, but not limited to, connectively interruptions, other technical difficulties, and unauthorized access by a third party to one's health information. Despite these risks, I agree to participate in the telehealth encounter.  I understand and agree that I or my healthcare provider may terminate a telehealth encounter at any time in the event of a technical malfunction.  I also understand that my location determines where medicine is being practiced. As a result, I will inform my provider where I am located at the time of my telehealth visit.  I understand that there may be costs associated with a telehealth visit. I agree that I am responsible for any fees associated with the telehealth services that I receive.  This Informed Consent for Telehealth Services During a Public Health Emergency will remain in effect solely during the term of the public health emergency.  Verbal consent given on Jul 29, 2021   3:00PM by Bran Mar from Mr. KRYSTIAN SANCHES   9.21.2021 The patient-doctor relationship has been established in a face to face fashion via real time video/audio HIPAA compliant communication using telemedicine software.  The patient's identity has been confirmed.  The patient was previously emailed a copy of the telemedicine consent.  They have had a chance to review and has now given verbal consent and has requested care to be assessed and treated through telemedicine and understands there maybe limitations in this process and they may need further follow up care in the office and or hospital settings.    THE FOLLOWING WAS READ TO AND CONSENTED TO BY PATIENT By virtue of my participation in this telehealth visit, I am consenting to receive care through telehealth. Telehealth is the use of electronic information and communication technologies by providers to deliver health care to patients at a distance.  I understand that any care provided to me through [employer]s telehealth application ("the [employer]ha") will incorporate security protocols to protect the privacy and security of my health information. If any other application is used to provide care to me, I understand that the technology may not contain appropriate security protocols to protect the privacy and security of my health information. My provider has explained to me the risks associated with the technology platforms that he or she is using to provide care to me. I acknowledge that there are potential risks associated with any technology used while obtaining care through telehealth, including, but not limited to, connectively interruptions, other technical difficulties, and unauthorized access by a third party to one's health information. Despite these risks, I agree to participate in the telehealth encounter.  I understand and agree that I or my healthcare provider may terminate a telehealth encounter at any time in the event of a technical malfunction.  I also understand that my location determines where medicine is being practiced. As a result, I will inform my provider where I am located at the time of my telehealth visit.  I understand that there may be costs associated with a telehealth visit. I agree that I am responsible for any fees associated with the telehealth services that I receive.  This Informed Consent for Telehealth Services During a Public Health Emergency will remain in effect solely during the term of the public health emergency.  Verbal consent given on Sep 21, 2021   2:30PM by Bran Mar from Mr. KRYSTIAN SANCHES   MULTIPLE ATTEMPTS AMWELL UNSUCCESSFUL [employer] [facility] with patient consent and awareness  returns re management of hypogonadism      9.21.2021 patient does not feel he is doing as well as he had previously has not been taking arimidex utilizing Testosterone 50mg %g 1% labs reviewed  patient reports PSA 0.49 normal testosterone and estradiol patient feels erection and energy are not as good discussed adding Arimidex but would recommend against in light of prior rise in Hbg/Hct   1.4.22  Father in hospital with COVID, pneumonia, sepsis Using sildenafil 60mg which has been less effective Pursuing bariatric surgery sleeve technique, discussed benefits for testosterone, estrogen, general well-being  plan: 1. continue testosterone 2. repeat labs T/Hbg/Hct 3. Sildenafil 100mg  4. followup in one month   .  5.17.2022 patient returns has stopped T secondary to elevated Hbg/Hct patient states that he wsa not sleeping well no energy did not feel well has not pursued bariatric surgery losing weight on his own; down to 263  discussed the cliniclal need for T replacement and the approach of quarterly therapeutic phlebotomy  P  2.21.2023 returns on transdermal T 1.62% increased dose to 5 pumps over 1 month improved energy  IPSS 5 ASAD 18  10.24.2023 returns on 5 pumps daily energy and libido not as good as when in packets decreased responsiveness and erections  2.21.2024 patient utilizing 4 pumps  6.11.2024 returns on 3 pumps / 4 pumps of T complaining of decreased libido poor sleep erections responds to Sildenafil 50 mg  6.26.2024 returns on -4 pumps on anastrozole 1 mg   7/30/24 Patient Monogiouduis returns for a 4-weeks urologic follow-up for low testosterone and sexual dysfunction Testosterone was at 333 (Low), Previous testosterone was 600 Estradiol was less than 5 Patient is currently taking anastrazole (0.5 mg daily)  and 4 pumps androgel Patient states his sexual function and  libido is not as good and sexual function is not as good but responds to sildenafil 100 Patient has insomnia, and mild sleep apnea which may affects testosterone  We discussed home situation (daughter now living with her father)   Discussed options: 1. Testosterone injection IM 2. +/ - long-acting acting injection (AVEED) repeat labs 2 hours after androgel

## 2024-07-30 NOTE — ASSESSMENT
[FreeTextEntry1] : Patient had been on maintenance aveed  Unable to continue because of pandemic  Will restart on Testim   We had an extensive discussion re Risks of T replacement We discussed risk to T exposure to partners and children from transdermal appoaches. Will restart Labs in 3 weeks if able to go to lab in light of pandemic (patient does not want to go out) fu in 10 weeks   7.21.2020 one packet of testosterone gel TESTIM was not covered by insurance 120 dollars per month energy levels; sexual function good when on reemphasized transmission risk protocol reviewed likes it better than AVEED will get labs in 3 weeks since patient ran out fu in 4 weeks    9.3.2020 patient pleased with gel good libido and sense of well being excellent erection ? premature ejaculation labs reviewed discussed estradiol increase had been on Arimidex will restart labs in 3 weeks  fu appt in 4 weeks    1.7.2021 patient has run out of Testosterone and arimidex labs from 12.2000 reviewed excellent T/E2 and H/H will restart discussed premature ejaculation PREMATURE EJACULATION The patient and I discussed the guidelines for the treatment of premature ejaculation. The patient reports that his symptoms cause him distress. Discussed squeeze technique and sensate focus approach Patient endorses that he attempts to "distance' himself and then ejaculates.  Second line therapy commonly includes SSRI's.  The NICK MONOGIOUDIS expressed fully understanding of the information provided, the consequences and the management.. He does not want to take additional medication and will attempt behavioural techniques Sexual dysfunction worse off oft T taking sildenafil continue current regimen Discussed PSA monitoring and controversy. Turning 51 Patient wants to proceed, labs in 3 months (h/h , PSA, T and Es followup in 3 months  July 29, 2021 Patient seen with telemedicine. He is maintained on testosterone 1% transdermal gel he has been doing well. He felt that his premature ejaculation was better while on anastrozole. He has not been on this. He does not feel premature ejaculation  He has not had bloods done. He ran out of testosterone 1 week ago. He feels that his sexual function has decreased. His energy has decreased. However he does feel well.  He wishes to restart his testosterone supplementation. We discussed the need for baseline blood studies. We discussed his previous polycythemia after testosterone. We discussed PSA screening. He wished to undergo PSA screening.  Plan: 1. Renewal of testosterone 50 mg / 5 g 1% 2. Testosterone, PSA estradiol, hemoglobin hematocrit 3. Office visit in 1 month with labs at that time    9.21.2021  patient does not feel he is doing as well as he had previously  has not been taking arimidex  utilizing Testosterone 50mg %g 1%  labs reviewed  patient reports PSA 0.49  normal testosterone and estradiol  patient feels erection and energy are not as good  discussed adding Arimidex but would recommend against in light of prior rise in Hbg/Hct      1.4.22 Father in hospital with COVID, pneumonia, sepsis Using sildenafil 60mg which has been less effective Pursuing bariatric surgery sleeve technique, discussed benefits for testosterone, estrogen, general well-being   plan: 1. continue testosterone 2. repeat labs T/Hbg/Hct 3. Sildenafil 100mg 4. followup in one month    5.17.2022  Patient returns regarding hypogonadism, sexual dysfunction and loss of energy. He states that he stopped taking transdermal testosterone as instructed after he was found to have colon 1. Hyperestrogenism, 2. Elevated hemoglobin and hematocrit. Since that time he is occasionally placed gel in light of loss of libido and energy and sexual function. He states he still responds to the phosphodiesterase inhibitor; however, he states he has loss of libido and energy. He is not pleased with his symptoms. He wishes to restart testosterone. We discussed the risk of restarting testosterone we reviewed his previous testosterone levels. He has not had significant elevation of his testosterone; his values are within the desired range. He has has had marked elevation of his estrogen and was placed on anastrozole. He states that while on anastrozole he felt markedly better with resolution of premature ejaculation etc. In told him that I did not think that this was likely to be the effects of anastrozole. Anastrozole would only be appropriate if he was found to have elevated estrogen. We discussed the fact that he might require periodic therapeutic phlebotomy if we restart his testosterone. He expressed full understanding and willingness to do this.    Also has noticed the fact that he is lost about 30 pounds since last seen. We discussed the potential impact of this on his testosterone and estrogen metabolism. Therefore we plan to proceed with new studies of his current testosterone, hemoglobin, hematocrit, estradiol etc. depending on the results of this he will undergo treatment with:  1. Transdermal testosterone  2. Transdermal testosterone and anastrozole or 3, anastrozole  4. Continue sildenafil 50 mg  The KRYSTIAN SANCHES expressed fully understanding of the information provided, the consequences and the management.  10.24.2023 Patient returns on 5 pounds of transdermal testosterone daily and anastrozole.  He states that he does not feel as energetic or of the penis.  He was supposed to have bloods drawn last week but did not have the stent.  We discussed the need to continue his current regimen and assess his testosterone and estradiol prior to altering his regimen.  We discussed his weight.  He is considering going on Ozempic.  He is utilizing sildenafil  Plan: 1. Testosterone 2 estradiol 3. renewal sildenafil  2.21.2024 started ozempic was not effective and has recently restarted; lost 5 lb;; decreased apeitite labs reviewed feels good on 4 pump (no differnect from 5); good energy, libido and sexual function pleased on reduced dose  has stopped anastrozole sexual function good  utilizes sildenafil as needed premature ejaculation resolved  Plan: 1. renewal T 2. labs T, Estradiol, H.H 3+/- anastrazole 4. sildenafil  6.11.2024 patient wants to resume anastrozole labs reviewed  increased estradiol complaining of loss of libido and sleeplessness discussed stresses of life (step daughter etc) continue on  4 pumps add anastrazole labs in 3 weeks fu to review labs and esponse The KRYSTIAN SANCHES  expressed fully understanding of the information provided, the consequences and the management. followup in weeks  6.24.2024 returns on androgel 3-4 pumps returns on anastrazole 1 mg improved sex drive and energy Testosterone 333 (2 hours after application) estradiol < 5  discussed ambiguous results recommend : androgel 4 pumps anastrazole 0.5 mg (or 1 mg qod)

## 2024-07-30 NOTE — HISTORY OF PRESENT ILLNESS
[Other Location: e.g. School (Enter Location, City,State)___] : at [unfilled], at the time of the visit. [Medical Office: ([facility])___] : at the medical office located in  [Verbal consent obtained from patient] : the patient, [unfilled] [FreeTextEntry1] : Patient referred by Dr Ankur Abel for sexual dysfunction. Patient complaining of loss of libido x 3 months. No evaluation. Erections generally good. One one occasion lost erection during SI.  2.5 (second marriage) years 2 step children; and one 1.5 year old son. Son with neutropenia (viral) Couple very anxious  4.26.2017 patient took only 40 mg of sildenafil ; 4/10 rigidity One hour later had partial tumescence Sufficient for SI; experienced premature ejaculation; had SI mild nasal congestion otherwise no side effects  5. 2017 started on testim; no side effects; "felt amazing" for the first several days and then regressed towards pretreatment. Symptoms better than prior to treatment.  7.19.2017 Continues on testim one tube daily; had difficulty getting 2 tubes daily; insurance covered brand name but not generic. Energy improved, sexual function with Sildenafil 100 mg; erections good, no prolonged erection. Pleased  10.19.2017 again stopped T replacement insurance would pay for brand but not generic. has been off for several days sexual function ok on sildenafil   7.25.2018 returns with diminished libido seen by internist and repeated T T 183 free T 39.1    2.13.2019  patient is now maintained on AVEED (3rd injection today)  6.26.2019 patient maintained on AVEED missed injection cycle now complaining of loss of libido and energy had surgery for deviated septum   8.14.2019 patient on AVEED and Arimidex Improved energy and sexual functon  11.13.2019 patient returns for AVEED and management he has stopped  ARIMIDEX (ran out of) feeling less energetic during this cycle  1.30.2020 patient returns for AVEED again is off cycle patient embarked on weigh reduction and has last 15 lbs  4.15.2020  The patient-doctor relationship has been established in a face to face fashion via real time video/audio HIPAA compliant communication using telemedicine software.  The patient's identity has been confirmed.  The patient was previously emailed a copy of the telemedicine consent.  They have had a chance to review and has now given verbal consent and has requested care to be assessed and treated through telemedicine and understands there maybe limitations in this process and they may need further follow up care in the office and or hospital settings.    THE FOLLOWING WAS READ TO AND CONSENTED TO BY PATIENT By virtue of my participation in this telehealth visit, I am consenting to receive care through telehealth. Telehealth is the use of electronic information and communication technologies by providers to deliver health care to patients at a distance.  I understand that any care provided to me through [employer]'s telehealth application ("the [employer]ha") will incorporate security protocols to protect the privacy and security of my health information. If any other application is used to provide care to me, I understand that the technology may not contain appropriate security protocols to protect the privacy and security of my health information. My provider has explained to me the risks associated with the technology platforms that he or she is using to provide care to me. I acknowledge that there are potential risks associated with any technology used while obtaining care through telehealth, including, but not limited to, connectively interruptions, other technical difficulties, and unauthorized access by a third party to one's health information. Despite these risks, I agree to participate in the telehealth encounter.  I understand and agree that I or my healthcare provider may terminate a telehealth encounter at any time in the event of a technical malfunction.  I also understand that my location determines where medicine is being practiced. As a result, I will inform my provider where I am located at the time of my telehealth visit.  I understand that there may be costs associated with a telehealth visit. I agree that I am responsible for any fees associated with the telehealth services that I receive.  This Informed Consent for Telehealth Services During a Public Health Emergency will remain in effect solely during the term of the public health emergency.  Verbal consent given on Apr 15, 2020  10:30AM by Bran Mar from Mr. KRYSTIAN SANCHES     Last aveed January 30th patient is behind schedule anxiety related to COVID19 sexual activity maintained  7.21.2020 The patient-doctor relationship has been established in a face to face fashion via real time video/audio HIPAA compliant communication using telemedicine software.  The patient's identity has been confirmed.  The patient was previously emailed a copy of the telemedicine consent.  They have had a chance to review and has now given verbal consent and has requested care to be assessed and treated through telemedicine and understands there maybe limitations in this process and they may need further follow up care in the office and or hospital settings.    THE FOLLOWING WAS READ TO AND CONSENTED TO BY PATIENT By virtue of my participation in this telehealth visit, I am consenting to receive care through telehealth. Telehealth is the use of electronic information and communication technologies by providers to deliver health care to patients at a distance.  I understand that any care provided to me through [employer]s telehealth application ("the [employer] k[employer]ha") will incorporate security protocols to protect the privacy and security of my health information. If any other application is used to provide care to me, I understand that the technology may not contain appropriate security protocols to protect the privacy and security of my health information. My provider has explained to me the risks associated with the technology platforms that he or she is using to provide care to me. I acknowledge that there are potential risks associated with any technology used while obtaining care through telehealth, including, but not limited to, connectively interruptions, other technical difficulties, and unauthorized access by a third party to one's health information. Despite these risks, I agree to participate in the telehealth encounter.  I understand and agree that I or my healthcare provider may terminate a telehealth encounter at any time in the event of a technical malfunction.  I also understand that my location determines where medicine is being practiced. As a result, I will inform my provider where I am located at the time of my telehealth visit.  I understand that there may be costs associated with a telehealth visit. I agree that I am responsible for any fees associated with the telehealth services that I receive.  This Informed Consent for Telehealth Services During a Public Health Emergency will remain in effect solely during the term of the public health emergency.  Verbal consent given on Jul 21, 2020  10:00AM by Bran Mar from Mr. KRYSTIAN SANCHES      9.3.2020 The patient-doctor relationship has been established in a face to face fashion via real time video/audio HIPAA compliant communication using telemedicine software.  The patient's identity has been confirmed.  The patient was previously emailed a copy of the telemedicine consent.  They have had a chance to review and has now given verbal consent and has requested care to be assessed and treated through telemedicine and understands there maybe limitations in this process and they may need further follow up care in the office and or hospital settings.    THE FOLLOWING WAS READ TO AND CONSENTED TO BY PATIENT By virtue of my participation in this telehealth visit, I am consenting to receive care through telehealth. Telehealth is the use of electronic information and communication technologies by providers to deliver health care to patients at a distance.  I understand that any care provided to me through [employer]s telehealth application ("the [employer] k[employer]") will incorporate security protocols to protect the privacy and security of my health information. If any other application is used to provide care to me, I understand that the technology may not contain appropriate security protocols to protect the privacy and security of my health information. My provider has explained to me the risks associated with the technology platforms that he or she is using to provide care to me. I acknowledge that there are potential risks associated with any technology used while obtaining care through telehealth, including, but not limited to, connectively interruptions, other technical difficulties, and unauthorized access by a third party to one's health information. Despite these risks, I agree to participate in the telehealth encounter.  I understand and agree that I or my healthcare provider may terminate a telehealth encounter at any time in the event of a technical malfunction.  I also understand that my location determines where medicine is being practiced. As a result, I will inform my provider where I am located at the time of my telehealth visit.  I understand that there may be costs associated with a telehealth visit. I agree that I am responsible for any fees associated with the telehealth services that I receive.  This Informed Consent for Telehealth Services During a Public Health Emergency will remain in effect solely during the term of the public health emergency.  Verbal consent given on Sep 03, 2020   3:30PM by Bran Mar from Mr. KRYSTIAN SANCHES    feeling good; energetic excellent erection premature ejaculation   1.7.2021 returns has been on arimidex and one tube testim patient felt that he had not been doing as welll was feeling less energetic  less interested in sexual function  complaining premature ejaculation  7.29.2021 The patient-doctor relationship has been established in a face to face fashion via real time video/audio HIPAA compliant communication using telemedicine software.  The patient's identity has been confirmed.  The patient was previously emailed a copy of the telemedicine consent.  They have had a chance to review and has now given verbal consent and has requested care to be assessed and treated through telemedicine and understands there maybe limitations in this process and they may need further follow up care in the office and or hospital settings.    THE FOLLOWING WAS READ TO AND CONSENTED TO BY PATIENT By virtue of my participation in this telehealth visit, I am consenting to receive care through telehealth. Telehealth is the use of electronic information and communication technologies by providers to deliver health care to patients at a distance.  I understand that any care provided to me through [employer]s telehealth application ("the [employer]") will incorporate security protocols to protect the privacy and security of my health information. If any other application is used to provide care to me, I understand that the technology may not contain appropriate security protocols to protect the privacy and security of my health information. My provider has explained to me the risks associated with the technology platforms that he or she is using to provide care to me. I acknowledge that there are potential risks associated with any technology used while obtaining care through telehealth, including, but not limited to, connectively interruptions, other technical difficulties, and unauthorized access by a third party to one's health information. Despite these risks, I agree to participate in the telehealth encounter.  I understand and agree that I or my healthcare provider may terminate a telehealth encounter at any time in the event of a technical malfunction.  I also understand that my location determines where medicine is being practiced. As a result, I will inform my provider where I am located at the time of my telehealth visit.  I understand that there may be costs associated with a telehealth visit. I agree that I am responsible for any fees associated with the telehealth services that I receive.  This Informed Consent for Telehealth Services During a Public Health Emergency will remain in effect solely during the term of the public health emergency.  Verbal consent given on Jul 29, 2021   3:00PM by Bran Mar from Mr. KRYSTIAN SANCHES   9.21.2021 The patient-doctor relationship has been established in a face to face fashion via real time video/audio HIPAA compliant communication using telemedicine software.  The patient's identity has been confirmed.  The patient was previously emailed a copy of the telemedicine consent.  They have had a chance to review and has now given verbal consent and has requested care to be assessed and treated through telemedicine and understands there maybe limitations in this process and they may need further follow up care in the office and or hospital settings.    THE FOLLOWING WAS READ TO AND CONSENTED TO BY PATIENT By virtue of my participation in this telehealth visit, I am consenting to receive care through telehealth. Telehealth is the use of electronic information and communication technologies by providers to deliver health care to patients at a distance.  I understand that any care provided to me through [employer]s telehealth application ("the [employer]") will incorporate security protocols to protect the privacy and security of my health information. If any other application is used to provide care to me, I understand that the technology may not contain appropriate security protocols to protect the privacy and security of my health information. My provider has explained to me the risks associated with the technology platforms that he or she is using to provide care to me. I acknowledge that there are potential risks associated with any technology used while obtaining care through telehealth, including, but not limited to, connectively interruptions, other technical difficulties, and unauthorized access by a third party to one's health information. Despite these risks, I agree to participate in the telehealth encounter.  I understand and agree that I or my healthcare provider may terminate a telehealth encounter at any time in the event of a technical malfunction.  I also understand that my location determines where medicine is being practiced. As a result, I will inform my provider where I am located at the time of my telehealth visit.  I understand that there may be costs associated with a telehealth visit. I agree that I am responsible for any fees associated with the telehealth services that I receive.  This Informed Consent for Telehealth Services During a Public Health Emergency will remain in effect solely during the term of the public health emergency.  Verbal consent given on Sep 21, 2021   2:30PM by Bran Mar from Mr. KRYSTIAN SANCHES   MULTIPLE ATTEMPTS AMWELL UNSUCCESSFUL [employer] with patient consent and awareness  returns re management of hypogonadism      9.21.2021 patient does not feel he is doing as well as he had previously has not been taking arimidex utilizing Testosterone 50mg %g 1% labs reviewed  patient reports PSA 0.49 normal testosterone and estradiol patient feels erection and energy are not as good discussed adding Arimidex but would recommend against in light of prior rise in Hbg/Hct   1.4.22  Father in hospital with COVID, pneumonia, sepsis Using sildenafil 60mg which has been less effective Pursuing bariatric surgery sleeve technique, discussed benefits for testosterone, estrogen, general well-being  plan: 1. continue testosterone 2. repeat labs T/Hbg/Hct 3. Sildenafil 100mg  4. followup in one month   .  5.17.2022 patient returns has stopped T secondary to elevated Hbg/Hct patient states that he wsa not sleeping well no energy did not feel well has not pursued bariatric surgery losing weight on his own; down to 263  discussed the cliniclal need for T replacement and the approach of quarterly therapeutic phlebotomy  P  2.21.2023 returns on transdermal T 1.62% increased dose to 5 pumps over 1 month improved energy  IPSS 5 ASAD 18  10.24.2023 returns on 5 pumps daily energy and libido not as good as when in packets decreased responsiveness and erections  2.21.2024 patient utilizing 4 pumps  6.11.2024 returns on 3 pumps / 4 pumps of T complaining of decreased libido poor sleep erections responds to Sildenafil 50 mg  6.26.2024 returns on -4 pumps on anastrozole 1 mg   7/30/24 Patient Monogiouduis returns for a 4-weeks urologic follow-up for low testosterone and sexual dysfunction Testosterone was at 333 (Low), Previous testosterone was 600 Estradiol was less than 5 Patient is currently taking anastrazole (0.5 mg daily)  and 4 pumps androgel Patient states his sexual function and  libido is not as good and sexual function is not as good but responds to sildenafil 100 Patient has insomnia, and mild sleep apnea which may affects testosterone  We discussed home situation (daughter now living with her father)   Discussed options: 1. Testosterone injection IM 2. +/ - long-acting acting injection (AVEED) repeat labs 2 hours after androgel

## 2024-08-07 LAB — ESTRADIOL SERPL-MCNC: 8 PG/ML

## 2024-08-08 ENCOUNTER — TRANSCRIPTION ENCOUNTER (OUTPATIENT)
Age: 54
End: 2024-08-08

## 2024-08-08 LAB
TESTOST FREE SERPL-MCNC: 18 PG/ML
TESTOST SERPL-MCNC: 451 NG/DL

## 2024-10-14 ENCOUNTER — APPOINTMENT (OUTPATIENT)
Dept: INTERNAL MEDICINE | Facility: CLINIC | Age: 54
End: 2024-10-14

## 2024-11-05 ENCOUNTER — APPOINTMENT (OUTPATIENT)
Dept: INTERNAL MEDICINE | Facility: CLINIC | Age: 54
End: 2024-11-05

## 2024-11-05 VITALS
BODY MASS INDEX: 41.58 KG/M2 | SYSTOLIC BLOOD PRESSURE: 126 MMHG | WEIGHT: 297 LBS | HEIGHT: 71 IN | OXYGEN SATURATION: 96 % | TEMPERATURE: 98.5 F | HEART RATE: 90 BPM | DIASTOLIC BLOOD PRESSURE: 78 MMHG

## 2024-11-05 DIAGNOSIS — Z87.898 PERSONAL HISTORY OF OTHER SPECIFIED CONDITIONS: ICD-10-CM

## 2024-11-05 DIAGNOSIS — R73.03 PREDIABETES.: ICD-10-CM

## 2024-11-05 DIAGNOSIS — G47.00 INSOMNIA, UNSPECIFIED: ICD-10-CM

## 2024-11-05 DIAGNOSIS — Z12.11 ENCOUNTER FOR SCREENING FOR MALIGNANT NEOPLASM OF COLON: ICD-10-CM

## 2024-11-05 DIAGNOSIS — G47.33 OBSTRUCTIVE SLEEP APNEA (ADULT) (PEDIATRIC): ICD-10-CM

## 2024-11-05 DIAGNOSIS — Z00.00 ENCOUNTER FOR GENERAL ADULT MEDICAL EXAMINATION W/OUT ABNORMAL FINDINGS: ICD-10-CM

## 2024-11-05 DIAGNOSIS — Z87.442 PERSONAL HISTORY OF URINARY CALCULI: ICD-10-CM

## 2024-11-05 DIAGNOSIS — E78.5 HYPERLIPIDEMIA, UNSPECIFIED: ICD-10-CM

## 2024-11-05 DIAGNOSIS — Z86.39 PERSONAL HISTORY OF OTHER ENDOCRINE, NUTRITIONAL AND METABOLIC DISEASE: ICD-10-CM

## 2024-11-05 DIAGNOSIS — U07.1 COVID-19: ICD-10-CM

## 2024-11-05 DIAGNOSIS — R68.82 DECREASED LIBIDO: ICD-10-CM

## 2024-11-05 DIAGNOSIS — E66.01 MORBID (SEVERE) OBESITY DUE TO EXCESS CALORIES: ICD-10-CM

## 2024-11-05 DIAGNOSIS — Z01.818 ENCOUNTER FOR OTHER PREPROCEDURAL EXAMINATION: ICD-10-CM

## 2024-11-05 DIAGNOSIS — Z23 ENCOUNTER FOR IMMUNIZATION: ICD-10-CM

## 2024-11-05 DIAGNOSIS — Z79.890 HORMONE REPLACEMENT THERAPY: ICD-10-CM

## 2024-11-05 DIAGNOSIS — Z86.69 PERSONAL HISTORY OF OTHER DISEASES OF THE NERVOUS SYSTEM AND SENSE ORGANS: ICD-10-CM

## 2024-11-05 DIAGNOSIS — Z12.5 ENCOUNTER FOR SCREENING FOR MALIGNANT NEOPLASM OF PROSTATE: ICD-10-CM

## 2024-11-05 DIAGNOSIS — Z87.09 PERSONAL HISTORY OF OTHER DISEASES OF THE RESPIRATORY SYSTEM: ICD-10-CM

## 2024-11-05 PROCEDURE — G0008: CPT

## 2024-11-05 PROCEDURE — 99214 OFFICE O/P EST MOD 30 MIN: CPT | Mod: 25

## 2024-11-05 PROCEDURE — 90656 IIV3 VACC NO PRSV 0.5 ML IM: CPT

## 2024-11-05 RX ORDER — ZOLPIDEM TARTRATE 12.5 MG/1
12.5 TABLET, EXTENDED RELEASE ORAL
Qty: 30 | Refills: 2 | Status: ACTIVE | COMMUNITY
Start: 2024-11-05 | End: 1900-01-01

## 2024-11-05 RX ORDER — CLONAZEPAM 1 MG/1
1 TABLET ORAL
Qty: 60 | Refills: 0 | Status: ACTIVE | COMMUNITY
Start: 1900-01-01 | End: 1900-01-01

## 2024-11-08 RX ORDER — SEMAGLUTIDE 1 MG/.5ML
1 INJECTION, SOLUTION SUBCUTANEOUS
Qty: 1 | Refills: 0 | Status: ACTIVE | COMMUNITY
Start: 2024-11-05 | End: 1900-01-01

## 2024-11-11 LAB
ALBUMIN SERPL ELPH-MCNC: 4.6 G/DL
ALP BLD-CCNC: 91 U/L
ALT SERPL-CCNC: 21 U/L
ANION GAP SERPL CALC-SCNC: 17 MMOL/L
APPEARANCE: CLEAR
AST SERPL-CCNC: 37 U/L
BACTERIA: NEGATIVE /HPF
BILIRUB SERPL-MCNC: 0.4 MG/DL
BILIRUBIN URINE: NEGATIVE
BLOOD URINE: NEGATIVE
BUN SERPL-MCNC: 23 MG/DL
CALCIUM SERPL-MCNC: 9.9 MG/DL
CAST: 0 /LPF
CHLORIDE SERPL-SCNC: 103 MMOL/L
CHOLEST SERPL-MCNC: 202 MG/DL
CO2 SERPL-SCNC: 24 MMOL/L
COLOR: YELLOW
CREAT SERPL-MCNC: 0.97 MG/DL
EGFR: 93 ML/MIN/1.73M2
EPITHELIAL CELLS: 0 /HPF
ESTIMATED AVERAGE GLUCOSE: 120 MG/DL
GLUCOSE QUALITATIVE U: NEGATIVE MG/DL
GLUCOSE SERPL-MCNC: 114 MG/DL
HBA1C MFR BLD HPLC: 5.8 %
HCT VFR BLD CALC: 48.4 %
HDLC SERPL-MCNC: 54 MG/DL
HGB BLD-MCNC: 15.9 G/DL
IRON SATN MFR SERPL: 28 %
IRON SERPL-MCNC: 76 UG/DL
KETONES URINE: NEGATIVE MG/DL
LDLC SERPL CALC-MCNC: 134 MG/DL
LEUKOCYTE ESTERASE URINE: ABNORMAL
MCHC RBC-ENTMCNC: 30.2 PG
MCHC RBC-ENTMCNC: 32.9 G/DL
MCV RBC AUTO: 92 FL
MICROSCOPIC-UA: NORMAL
NITRITE URINE: NEGATIVE
NONHDLC SERPL-MCNC: 148 MG/DL
PH URINE: 5.5
PLATELET # BLD AUTO: 242 K/UL
POTASSIUM SERPL-SCNC: 4.1 MMOL/L
PROT SERPL-MCNC: 7.4 G/DL
PROTEIN URINE: NEGATIVE MG/DL
PSA SERPL-MCNC: 2.06 NG/ML
RBC # BLD: 5.26 M/UL
RBC # FLD: 14.1 %
RED BLOOD CELLS URINE: 0 /HPF
REVIEW: NORMAL
SODIUM SERPL-SCNC: 144 MMOL/L
SPECIFIC GRAVITY URINE: 1.02
TIBC SERPL-MCNC: 273 UG/DL
TRIGL SERPL-MCNC: 79 MG/DL
TSH SERPL-ACNC: 2.12 UIU/ML
UIBC SERPL-MCNC: 197 UG/DL
UROBILINOGEN URINE: 0.2 MG/DL
WBC # FLD AUTO: 6.92 K/UL
WHITE BLOOD CELLS URINE: 1 /HPF

## 2024-11-12 LAB
TESTOST FREE SERPL-MCNC: 18 PG/ML
TESTOST SERPL-MCNC: 452 NG/DL

## 2024-11-13 ENCOUNTER — TRANSCRIPTION ENCOUNTER (OUTPATIENT)
Age: 54
End: 2024-11-13

## 2024-11-14 ENCOUNTER — APPOINTMENT (OUTPATIENT)
Dept: UROLOGY | Facility: CLINIC | Age: 54
End: 2024-11-14

## 2024-11-14 DIAGNOSIS — N52.9 MALE ERECTILE DYSFUNCTION, UNSPECIFIED: ICD-10-CM

## 2024-11-14 DIAGNOSIS — E29.1 TESTICULAR HYPOFUNCTION: ICD-10-CM

## 2024-11-14 DIAGNOSIS — E28.0 ESTROGEN EXCESS: ICD-10-CM

## 2024-11-14 DIAGNOSIS — R97.20 ELEVATED PROSTATE, SPECIFIC ANTIGEN [PSA]: ICD-10-CM

## 2024-11-14 PROCEDURE — 99214 OFFICE O/P EST MOD 30 MIN: CPT

## 2024-11-15 LAB
APPEARANCE: CLEAR
BACTERIA: NEGATIVE /HPF
BILIRUBIN URINE: NEGATIVE
BLOOD URINE: NEGATIVE
CAST: 0 /LPF
COLOR: YELLOW
EPITHELIAL CELLS: 0 /HPF
ESTRADIOL SERPL-MCNC: 5 PG/ML
GLUCOSE QUALITATIVE U: NEGATIVE MG/DL
KETONES URINE: NEGATIVE MG/DL
LEUKOCYTE ESTERASE URINE: NEGATIVE
MICROSCOPIC-UA: NORMAL
NITRITE URINE: NEGATIVE
PH URINE: 5.5
PROTEIN URINE: NEGATIVE MG/DL
PSA SERPL-MCNC: 1.24 NG/ML
RED BLOOD CELLS URINE: 0 /HPF
SPECIFIC GRAVITY URINE: 1.02
UROBILINOGEN URINE: 0.2 MG/DL
WHITE BLOOD CELLS URINE: 0 /HPF

## 2024-11-16 ENCOUNTER — TRANSCRIPTION ENCOUNTER (OUTPATIENT)
Age: 54
End: 2024-11-16

## 2024-11-16 LAB — BACTERIA UR CULT: NORMAL

## 2025-02-05 DIAGNOSIS — R97.20 ELEVATED PROSTATE, SPECIFIC ANTIGEN [PSA]: ICD-10-CM

## 2025-02-06 ENCOUNTER — APPOINTMENT (OUTPATIENT)
Dept: UROLOGY | Facility: CLINIC | Age: 55
End: 2025-02-06

## 2025-02-08 LAB — PSA SERPL-MCNC: 0.87 NG/ML

## 2025-03-04 ENCOUNTER — APPOINTMENT (OUTPATIENT)
Dept: INTERNAL MEDICINE | Facility: CLINIC | Age: 55
End: 2025-03-04
Payer: COMMERCIAL

## 2025-03-04 VITALS
DIASTOLIC BLOOD PRESSURE: 88 MMHG | TEMPERATURE: 98.7 F | OXYGEN SATURATION: 96 % | RESPIRATION RATE: 16 BRPM | HEART RATE: 79 BPM | SYSTOLIC BLOOD PRESSURE: 112 MMHG | WEIGHT: 287 LBS | BODY MASS INDEX: 40.18 KG/M2 | HEIGHT: 71 IN

## 2025-03-04 DIAGNOSIS — J06.9 ACUTE UPPER RESPIRATORY INFECTION, UNSPECIFIED: ICD-10-CM

## 2025-03-04 DIAGNOSIS — J30.9 ALLERGIC RHINITIS, UNSPECIFIED: ICD-10-CM

## 2025-03-04 DIAGNOSIS — Z00.00 ENCOUNTER FOR GENERAL ADULT MEDICAL EXAMINATION W/OUT ABNORMAL FINDINGS: ICD-10-CM

## 2025-03-04 DIAGNOSIS — J30.89 OTHER ALLERGIC RHINITIS: ICD-10-CM

## 2025-03-04 PROCEDURE — 99214 OFFICE O/P EST MOD 30 MIN: CPT

## 2025-03-04 RX ORDER — AZITHROMYCIN 250 MG/1
250 TABLET, FILM COATED ORAL
Qty: 1 | Refills: 0 | Status: ACTIVE | COMMUNITY
Start: 2025-03-04 | End: 1900-01-01

## 2025-03-04 RX ORDER — PROMETHAZINE HYDROCHLORIDE AND DEXTROMETHORPHAN HYDROBROMIDE ORAL SOLUTION 15; 6.25 MG/5ML; MG/5ML
6.25-15 SOLUTION ORAL
Qty: 200 | Refills: 0 | Status: ACTIVE | COMMUNITY
Start: 2025-03-04 | End: 1900-01-01

## 2025-03-04 RX ORDER — MOMETASONE 50 UG/1
50 SPRAY, METERED NASAL
Qty: 1 | Refills: 3 | Status: ACTIVE | COMMUNITY
Start: 2025-03-04 | End: 1900-01-01

## 2025-03-04 RX ORDER — FLUTICASONE PROPIONATE 50 UG/1
50 SPRAY, METERED NASAL
Qty: 1 | Refills: 1 | Status: ACTIVE | COMMUNITY
Start: 2025-03-04 | End: 1900-01-01

## 2025-05-06 ENCOUNTER — APPOINTMENT (OUTPATIENT)
Dept: INTERNAL MEDICINE | Facility: CLINIC | Age: 55
End: 2025-05-06
Payer: COMMERCIAL

## 2025-05-06 VITALS
SYSTOLIC BLOOD PRESSURE: 122 MMHG | WEIGHT: 287 LBS | OXYGEN SATURATION: 96 % | TEMPERATURE: 98.9 F | BODY MASS INDEX: 40.18 KG/M2 | HEIGHT: 71 IN | HEART RATE: 73 BPM | DIASTOLIC BLOOD PRESSURE: 76 MMHG | RESPIRATION RATE: 16 BRPM

## 2025-05-06 DIAGNOSIS — Z79.890 HORMONE REPLACEMENT THERAPY: ICD-10-CM

## 2025-05-06 DIAGNOSIS — E28.0 ESTROGEN EXCESS: ICD-10-CM

## 2025-05-06 DIAGNOSIS — G47.33 OBSTRUCTIVE SLEEP APNEA (ADULT) (PEDIATRIC): ICD-10-CM

## 2025-05-06 DIAGNOSIS — J30.9 ALLERGIC RHINITIS, UNSPECIFIED: ICD-10-CM

## 2025-05-06 DIAGNOSIS — J06.9 ACUTE UPPER RESPIRATORY INFECTION, UNSPECIFIED: ICD-10-CM

## 2025-05-06 DIAGNOSIS — E66.01 MORBID (SEVERE) OBESITY DUE TO EXCESS CALORIES: ICD-10-CM

## 2025-05-06 DIAGNOSIS — E78.5 HYPERLIPIDEMIA, UNSPECIFIED: ICD-10-CM

## 2025-05-06 DIAGNOSIS — E29.1 TESTICULAR HYPOFUNCTION: ICD-10-CM

## 2025-05-06 DIAGNOSIS — J30.89 OTHER ALLERGIC RHINITIS: ICD-10-CM

## 2025-05-06 PROCEDURE — 99214 OFFICE O/P EST MOD 30 MIN: CPT

## 2025-05-06 PROCEDURE — G2211 COMPLEX E/M VISIT ADD ON: CPT | Mod: NC

## 2025-05-07 ENCOUNTER — NON-APPOINTMENT (OUTPATIENT)
Age: 55
End: 2025-05-07

## 2025-05-08 LAB
25(OH)D3 SERPL-MCNC: 31.1 NG/ML
ALBUMIN SERPL ELPH-MCNC: 4.6 G/DL
ALP BLD-CCNC: 103 U/L
ALT SERPL-CCNC: 20 U/L
ANION GAP SERPL CALC-SCNC: 15 MMOL/L
APPEARANCE: CLEAR
AST SERPL-CCNC: 37 U/L
BACTERIA: NEGATIVE /HPF
BILIRUB SERPL-MCNC: 0.4 MG/DL
BILIRUBIN URINE: NEGATIVE
BLOOD URINE: NEGATIVE
BUN SERPL-MCNC: 15 MG/DL
CALCIUM SERPL-MCNC: 9.8 MG/DL
CAST: 0 /LPF
CHLORIDE SERPL-SCNC: 103 MMOL/L
CHOLEST SERPL-MCNC: 176 MG/DL
CO2 SERPL-SCNC: 24 MMOL/L
COLOR: YELLOW
CREAT SERPL-MCNC: 1.02 MG/DL
EGFRCR SERPLBLD CKD-EPI 2021: 88 ML/MIN/1.73M2
EPITHELIAL CELLS: 0 /HPF
ESTIMATED AVERAGE GLUCOSE: 128 MG/DL
GLUCOSE QUALITATIVE U: NEGATIVE MG/DL
GLUCOSE SERPL-MCNC: 99 MG/DL
HBA1C MFR BLD HPLC: 6.1 %
HCT VFR BLD CALC: 50 %
HDLC SERPL-MCNC: 44 MG/DL
HGB BLD-MCNC: 16 G/DL
IRON SATN MFR SERPL: 27 %
IRON SERPL-MCNC: 70 UG/DL
KETONES URINE: NEGATIVE MG/DL
LDLC SERPL-MCNC: 115 MG/DL
LEUKOCYTE ESTERASE URINE: ABNORMAL
MCHC RBC-ENTMCNC: 29.1 PG
MCHC RBC-ENTMCNC: 32 G/DL
MCV RBC AUTO: 91.1 FL
MICROSCOPIC-UA: NORMAL
NITRITE URINE: NEGATIVE
NONHDLC SERPL-MCNC: 132 MG/DL
PH URINE: 5.5
PLATELET # BLD AUTO: 241 K/UL
POTASSIUM SERPL-SCNC: 4.3 MMOL/L
PROT SERPL-MCNC: 7.5 G/DL
PROTEIN URINE: NEGATIVE MG/DL
PSA SERPL-MCNC: 0.95 NG/ML
RBC # BLD: 5.49 M/UL
RBC # FLD: 15.3 %
RED BLOOD CELLS URINE: 1 /HPF
SODIUM SERPL-SCNC: 142 MMOL/L
SPECIFIC GRAVITY URINE: 1.02
TIBC SERPL-MCNC: 262 UG/DL
TRIGL SERPL-MCNC: 96 MG/DL
TSH SERPL-ACNC: 1.54 UIU/ML
UIBC SERPL-MCNC: 192 UG/DL
UROBILINOGEN URINE: 0.2 MG/DL
WBC # FLD AUTO: 6.58 K/UL
WHITE BLOOD CELLS URINE: 0 /HPF

## 2025-05-09 LAB
TESTOST FREE SERPL-MCNC: 15.7 PG/ML
TESTOST SERPL-MCNC: 187 NG/DL

## 2025-05-13 DIAGNOSIS — R73.03 PREDIABETES.: ICD-10-CM

## 2025-06-09 PROBLEM — N20.0 CALCULUS OF KIDNEY: Status: ACTIVE | Noted: 2019-10-17

## 2025-06-12 ENCOUNTER — APPOINTMENT (OUTPATIENT)
Dept: ULTRASOUND IMAGING | Facility: CLINIC | Age: 55
End: 2025-06-12
Payer: COMMERCIAL

## 2025-06-12 ENCOUNTER — OUTPATIENT (OUTPATIENT)
Dept: OUTPATIENT SERVICES | Facility: HOSPITAL | Age: 55
LOS: 1 days | End: 2025-06-12
Payer: COMMERCIAL

## 2025-06-12 DIAGNOSIS — N20.0 CALCULUS OF KIDNEY: ICD-10-CM

## 2025-06-12 DIAGNOSIS — M75.101 UNSPECIFIED ROTATOR CUFF TEAR OR RUPTURE OF RIGHT SHOULDER, NOT SPECIFIED AS TRAUMATIC: Chronic | ICD-10-CM

## 2025-06-12 PROCEDURE — 76770 US EXAM ABDO BACK WALL COMP: CPT

## 2025-06-12 PROCEDURE — 76770 US EXAM ABDO BACK WALL COMP: CPT | Mod: 26

## 2025-06-16 ENCOUNTER — TRANSCRIPTION ENCOUNTER (OUTPATIENT)
Age: 55
End: 2025-06-16

## 2025-06-17 ENCOUNTER — APPOINTMENT (OUTPATIENT)
Dept: INTERNAL MEDICINE | Facility: CLINIC | Age: 55
End: 2025-06-17
Payer: COMMERCIAL

## 2025-06-17 VITALS
WEIGHT: 270 LBS | DIASTOLIC BLOOD PRESSURE: 86 MMHG | HEART RATE: 101 BPM | RESPIRATION RATE: 16 BRPM | SYSTOLIC BLOOD PRESSURE: 150 MMHG | BODY MASS INDEX: 37.8 KG/M2 | TEMPERATURE: 99.5 F | HEIGHT: 71 IN | OXYGEN SATURATION: 96 %

## 2025-06-17 PROCEDURE — G2211 COMPLEX E/M VISIT ADD ON: CPT | Mod: NC

## 2025-06-17 PROCEDURE — 99214 OFFICE O/P EST MOD 30 MIN: CPT

## 2025-06-18 ENCOUNTER — APPOINTMENT (OUTPATIENT)
Age: 55
End: 2025-06-18
Payer: COMMERCIAL

## 2025-06-18 ENCOUNTER — NON-APPOINTMENT (OUTPATIENT)
Age: 55
End: 2025-06-18

## 2025-06-18 PROCEDURE — 92134 CPTRZ OPH DX IMG PST SGM RTA: CPT

## 2025-06-18 PROCEDURE — 92201 OPSCPY EXTND RTA DRAW UNI/BI: CPT

## 2025-06-18 PROCEDURE — 92014 COMPRE OPH EXAM EST PT 1/>: CPT

## 2025-06-20 ENCOUNTER — APPOINTMENT (OUTPATIENT)
Dept: GASTROENTEROLOGY | Facility: CLINIC | Age: 55
End: 2025-06-20
Payer: COMMERCIAL

## 2025-06-20 VITALS
SYSTOLIC BLOOD PRESSURE: 118 MMHG | BODY MASS INDEX: 37.52 KG/M2 | DIASTOLIC BLOOD PRESSURE: 78 MMHG | HEIGHT: 71 IN | WEIGHT: 268 LBS

## 2025-06-20 PROBLEM — R10.9 ABDOMINAL PAIN: Status: ACTIVE | Noted: 2025-06-20

## 2025-06-20 PROBLEM — R19.5 PALE STOOL: Status: ACTIVE | Noted: 2025-06-20

## 2025-06-20 PROCEDURE — 99212 OFFICE O/P EST SF 10 MIN: CPT

## 2025-06-20 RX ORDER — TIRZEPATIDE 5 MG/.5ML
5 INJECTION, SOLUTION SUBCUTANEOUS
Qty: 2 | Refills: 0 | Status: ACTIVE | COMMUNITY
Start: 2025-05-29

## 2025-06-20 RX ORDER — AMLODIPINE BESYLATE 2.5 MG/1
2.5 TABLET ORAL
Qty: 7 | Refills: 0 | Status: ACTIVE | COMMUNITY
Start: 2025-06-05

## 2025-06-25 ENCOUNTER — APPOINTMENT (OUTPATIENT)
Age: 55
End: 2025-06-25

## 2025-06-27 ENCOUNTER — APPOINTMENT (OUTPATIENT)
Dept: INTERNAL MEDICINE | Facility: CLINIC | Age: 55
End: 2025-06-27
Payer: COMMERCIAL

## 2025-06-27 ENCOUNTER — LABORATORY RESULT (OUTPATIENT)
Age: 55
End: 2025-06-27

## 2025-06-27 VITALS
RESPIRATION RATE: 16 BRPM | HEIGHT: 71 IN | HEART RATE: 85 BPM | WEIGHT: 265 LBS | DIASTOLIC BLOOD PRESSURE: 82 MMHG | BODY MASS INDEX: 37.1 KG/M2 | TEMPERATURE: 98.9 F | OXYGEN SATURATION: 97 % | SYSTOLIC BLOOD PRESSURE: 130 MMHG

## 2025-06-27 PROBLEM — Z91.89 RISK OF EXPOSURE TO LYME DISEASE: Status: ACTIVE | Noted: 2025-06-27

## 2025-06-27 PROBLEM — R19.5 PALE STOOL: Status: ACTIVE | Noted: 2025-06-27

## 2025-06-27 PROCEDURE — 99496 TRANSJ CARE MGMT HIGH F2F 7D: CPT

## 2025-06-27 RX ORDER — TRAZODONE HYDROCHLORIDE 50 MG/1
50 TABLET ORAL
Qty: 30 | Refills: 5 | Status: DISCONTINUED | COMMUNITY
Start: 2025-06-17 | End: 2025-06-27

## 2025-06-27 RX ORDER — HYDROXYZINE HYDROCHLORIDE 10 MG/1
10 TABLET ORAL
Qty: 90 | Refills: 0 | Status: DISCONTINUED | COMMUNITY
Start: 2025-06-06 | End: 2025-06-27

## 2025-06-27 RX ORDER — LOSARTAN POTASSIUM 25 MG/1
25 TABLET, FILM COATED ORAL
Qty: 30 | Refills: 5 | Status: DISCONTINUED | COMMUNITY
Start: 2025-06-17 | End: 2025-06-27

## 2025-06-27 RX ORDER — DOXYCYCLINE HYCLATE 100 MG/1
100 TABLET, COATED ORAL
Qty: 2 | Refills: 0 | Status: ACTIVE | COMMUNITY
Start: 2025-06-27 | End: 1900-01-01

## 2025-06-27 RX ORDER — MUPIROCIN 20 MG/G
2 OINTMENT TOPICAL TWICE DAILY
Qty: 1 | Refills: 3 | Status: ACTIVE | COMMUNITY
Start: 2025-06-27 | End: 1900-01-01

## 2025-06-27 RX ORDER — DILTIAZEM HYDROCHLORIDE 120 MG/1
120 CAPSULE, EXTENDED RELEASE ORAL
Qty: 180 | Refills: 0 | Status: DISCONTINUED | COMMUNITY
Start: 2025-06-06 | End: 2025-06-27

## 2025-06-27 RX ORDER — ESCITALOPRAM OXALATE 10 MG/1
10 TABLET ORAL
Qty: 30 | Refills: 5 | Status: DISCONTINUED | COMMUNITY
Start: 2025-06-17 | End: 2025-06-27

## 2025-06-27 RX ORDER — AMLODIPINE BESYLATE 5 MG/1
5 TABLET ORAL
Qty: 15 | Refills: 0 | Status: DISCONTINUED | COMMUNITY
Start: 2025-06-14 | End: 2025-06-27

## 2025-06-27 RX ORDER — QUETIAPINE FUMARATE 50 MG/1
50 TABLET ORAL
Qty: 60 | Refills: 0 | Status: DISCONTINUED | COMMUNITY
Start: 2025-05-22 | End: 2025-06-27

## 2025-06-27 RX ORDER — MONTELUKAST 10 MG/1
10 TABLET, FILM COATED ORAL
Qty: 30 | Refills: 0 | Status: DISCONTINUED | COMMUNITY
Start: 2025-05-21 | End: 2025-06-27

## 2025-06-27 RX ORDER — HYDROXYZINE HYDROCHLORIDE 50 MG/1
50 TABLET ORAL
Qty: 30 | Refills: 0 | Status: DISCONTINUED | COMMUNITY
Start: 2025-06-04 | End: 2025-06-27

## 2025-06-30 LAB
A PHAGO GROEL BLD QL NAA+NON-PROBE: NEGATIVE
ALBUMIN SERPL ELPH-MCNC: 4.6 G/DL
ALP BLD-CCNC: 107 U/L
ALT SERPL-CCNC: 34 U/L
AMYLASE/CREAT SERPL: 49 U/L
ANION GAP SERPL CALC-SCNC: 14 MMOL/L
AST SERPL-CCNC: 53 U/L
BABESIA SPECIES PCR: NOT DETECTED
BACTERIA STL CULT: NORMAL
BILIRUB SERPL-MCNC: 0.4 MG/DL
BUN SERPL-MCNC: 14 MG/DL
CALCIUM SERPL-MCNC: 9.8 MG/DL
CHLORIDE SERPL-SCNC: 102 MMOL/L
CO2 SERPL-SCNC: 23 MMOL/L
CREAT SERPL-MCNC: 0.81 MG/DL
DEPRECATED O AND P PREP STL: NORMAL
E CANIS+EWIN GROEL BLD QL NAA+NON-PROBE: NEGATIVE
E CHAFF GROEL BLD QL NAA+NON-PROBE: NEGATIVE
E MURIS EAUCL GROEL BLD QL NAA+NON-PRB: NEGATIVE
EGFRCR SERPLBLD CKD-EPI 2021: 105 ML/MIN/1.73M2
GI PCR PANEL: NOT DETECTED
GLUCOSE SERPL-MCNC: 122 MG/DL
HAV IGM SER QL: NONREACTIVE
HAV IGM SER QL: NONREACTIVE
HBV CORE IGM SER QL: NONREACTIVE
HBV CORE IGM SER QL: NONREACTIVE
HBV SURFACE AG SER QL: NONREACTIVE
HBV SURFACE AG SER QL: NONREACTIVE
HCV AB SER QL: NONREACTIVE
HCV S/CO RATIO: 0.12 S/CO
HEPATITIS E IGM ABY: NEGATIVE
LPL SERPL-CCNC: 38 U/L
POTASSIUM SERPL-SCNC: 4.6 MMOL/L
PROT SERPL-MCNC: 7.6 G/DL
SODIUM SERPL-SCNC: 139 MMOL/L

## 2025-07-01 ENCOUNTER — APPOINTMENT (OUTPATIENT)
Age: 55
End: 2025-07-01
Payer: COMMERCIAL

## 2025-07-01 ENCOUNTER — OUTPATIENT (OUTPATIENT)
Dept: OUTPATIENT SERVICES | Facility: HOSPITAL | Age: 55
LOS: 1 days | End: 2025-07-01

## 2025-07-01 ENCOUNTER — NON-APPOINTMENT (OUTPATIENT)
Age: 55
End: 2025-07-01

## 2025-07-01 DIAGNOSIS — M75.101 UNSPECIFIED ROTATOR CUFF TEAR OR RUPTURE OF RIGHT SHOULDER, NOT SPECIFIED AS TRAUMATIC: Chronic | ICD-10-CM

## 2025-07-01 DIAGNOSIS — G47.33 OBSTRUCTIVE SLEEP APNEA (ADULT) (PEDIATRIC): ICD-10-CM

## 2025-07-01 LAB
C DIFF TOX B STL QL CT TISS CULT: NORMAL
Lab: NORMAL

## 2025-07-01 PROCEDURE — 95800 SLP STDY UNATTENDED: CPT | Mod: 26

## 2025-07-01 PROCEDURE — 95800 SLP STDY UNATTENDED: CPT

## 2025-07-01 PROCEDURE — 92012 INTRM OPH EXAM EST PATIENT: CPT

## 2025-07-03 ENCOUNTER — APPOINTMENT (OUTPATIENT)
Dept: UROLOGY | Facility: CLINIC | Age: 55
End: 2025-07-03
Payer: COMMERCIAL

## 2025-07-03 VITALS
TEMPERATURE: 97.9 F | SYSTOLIC BLOOD PRESSURE: 123 MMHG | DIASTOLIC BLOOD PRESSURE: 84 MMHG | HEART RATE: 106 BPM | OXYGEN SATURATION: 97 %

## 2025-07-03 LAB — HEPATITIS D VIRUS IGM AB: NORMAL

## 2025-07-03 PROCEDURE — 99214 OFFICE O/P EST MOD 30 MIN: CPT | Mod: 25

## 2025-07-03 PROCEDURE — 51798 US URINE CAPACITY MEASURE: CPT

## 2025-07-03 PROCEDURE — 51741 ELECTRO-UROFLOWMETRY FIRST: CPT

## 2025-07-18 ENCOUNTER — APPOINTMENT (OUTPATIENT)
Dept: INTERNAL MEDICINE | Facility: CLINIC | Age: 55
End: 2025-07-18
Payer: COMMERCIAL

## 2025-07-18 VITALS
OXYGEN SATURATION: 97 % | RESPIRATION RATE: 16 BRPM | TEMPERATURE: 99 F | HEIGHT: 71 IN | SYSTOLIC BLOOD PRESSURE: 130 MMHG | BODY MASS INDEX: 38.08 KG/M2 | WEIGHT: 272 LBS | DIASTOLIC BLOOD PRESSURE: 88 MMHG | HEART RATE: 94 BPM

## 2025-07-18 PROBLEM — I10 HYPERTENSION, UNCONTROLLED: Status: ACTIVE | Noted: 2025-06-17

## 2025-07-18 PROBLEM — G47.33 MODERATE OBSTRUCTIVE SLEEP APNEA: Status: ACTIVE | Noted: 2025-07-18

## 2025-07-18 PROBLEM — F41.9 ANXIETY: Status: ACTIVE | Noted: 2025-06-17

## 2025-07-18 PROCEDURE — 99214 OFFICE O/P EST MOD 30 MIN: CPT

## 2025-07-31 ENCOUNTER — APPOINTMENT (OUTPATIENT)
Dept: UROLOGY | Facility: CLINIC | Age: 55
End: 2025-07-31
Payer: COMMERCIAL

## 2025-07-31 DIAGNOSIS — E29.1 TESTICULAR HYPOFUNCTION: ICD-10-CM

## 2025-07-31 DIAGNOSIS — N52.9 MALE ERECTILE DYSFUNCTION, UNSPECIFIED: ICD-10-CM

## 2025-07-31 DIAGNOSIS — F52.4 PREMATURE EJACULATION: ICD-10-CM

## 2025-07-31 DIAGNOSIS — E28.0 ESTROGEN EXCESS: ICD-10-CM

## 2025-07-31 PROCEDURE — 99214 OFFICE O/P EST MOD 30 MIN: CPT | Mod: 3W

## 2025-08-01 ENCOUNTER — NON-APPOINTMENT (OUTPATIENT)
Age: 55
End: 2025-08-01

## 2025-08-04 ENCOUNTER — NON-APPOINTMENT (OUTPATIENT)
Age: 55
End: 2025-08-04

## 2025-09-02 ENCOUNTER — APPOINTMENT (OUTPATIENT)
Dept: OPHTHALMOLOGY | Facility: CLINIC | Age: 55
End: 2025-09-02
Payer: COMMERCIAL

## 2025-09-02 ENCOUNTER — NON-APPOINTMENT (OUTPATIENT)
Age: 55
End: 2025-09-02

## 2025-09-02 PROCEDURE — 92083 EXTENDED VISUAL FIELD XM: CPT

## 2025-09-02 PROCEDURE — 99214 OFFICE O/P EST MOD 30 MIN: CPT
